# Patient Record
Sex: MALE | Race: WHITE | Employment: OTHER | URBAN - METROPOLITAN AREA
[De-identification: names, ages, dates, MRNs, and addresses within clinical notes are randomized per-mention and may not be internally consistent; named-entity substitution may affect disease eponyms.]

---

## 2021-02-09 ENCOUNTER — HOSPITAL ENCOUNTER (EMERGENCY)
Age: 76
Discharge: HOME | End: 2021-02-09
Attending: EMERGENCY MEDICINE
Payer: MEDICARE

## 2021-02-09 ENCOUNTER — APPOINTMENT (OUTPATIENT)
Dept: GENERAL RADIOLOGY | Age: 76
End: 2021-02-09
Attending: EMERGENCY MEDICINE
Payer: MEDICARE

## 2021-02-09 VITALS
WEIGHT: 229.28 LBS | BODY MASS INDEX: 33.96 KG/M2 | RESPIRATION RATE: 16 BRPM | HEIGHT: 69 IN | HEART RATE: 100 BPM | DIASTOLIC BLOOD PRESSURE: 83 MMHG | TEMPERATURE: 98.1 F | OXYGEN SATURATION: 98 % | SYSTOLIC BLOOD PRESSURE: 135 MMHG

## 2021-02-09 DIAGNOSIS — J06.9 UPPER RESPIRATORY TRACT INFECTION, UNSPECIFIED TYPE: Primary | ICD-10-CM

## 2021-02-09 LAB — SARS-COV-2, COV2: NORMAL

## 2021-02-09 PROCEDURE — 71045 X-RAY EXAM CHEST 1 VIEW: CPT

## 2021-02-09 PROCEDURE — 99284 EMERGENCY DEPT VISIT MOD MDM: CPT

## 2021-02-09 PROCEDURE — U0003 INFECTIOUS AGENT DETECTION BY NUCLEIC ACID (DNA OR RNA); SEVERE ACUTE RESPIRATORY SYNDROME CORONAVIRUS 2 (SARS-COV-2) (CORONAVIRUS DISEASE [COVID-19]), AMPLIFIED PROBE TECHNIQUE, MAKING USE OF HIGH THROUGHPUT TECHNOLOGIES AS DESCRIBED BY CMS-2020-01-R: HCPCS

## 2021-02-09 PROCEDURE — U0005 INFEC AGEN DETEC AMPLI PROBE: HCPCS

## 2021-02-09 RX ORDER — TRIAMCINOLONE ACETONIDE 55 UG/1
2 SPRAY, METERED NASAL DAILY
Qty: 1 BOTTLE | Refills: 0 | Status: ON HOLD | OUTPATIENT
Start: 2021-02-09 | End: 2022-03-31 | Stop reason: CLARIF

## 2021-02-09 RX ORDER — ALBUTEROL SULFATE 0.83 MG/ML
2.5 SOLUTION RESPIRATORY (INHALATION)
Qty: 30 NEBULE | Refills: 1 | Status: ON HOLD | OUTPATIENT
Start: 2021-02-09 | End: 2022-03-31 | Stop reason: CLARIF

## 2021-02-09 RX ORDER — BENZONATATE 100 MG/1
100 CAPSULE ORAL
Qty: 30 CAP | Refills: 0 | Status: SHIPPED | OUTPATIENT
Start: 2021-02-09 | End: 2021-02-16

## 2021-02-09 RX ORDER — AZITHROMYCIN 250 MG/1
TABLET, FILM COATED ORAL
Qty: 6 TAB | Refills: 0 | Status: SHIPPED | OUTPATIENT
Start: 2021-02-09 | End: 2021-02-14

## 2021-02-09 RX ORDER — METFORMIN HYDROCHLORIDE 500 MG/1
TABLET ORAL 2 TIMES DAILY WITH MEALS
COMMUNITY

## 2021-02-09 RX ORDER — NEBULIZER AND COMPRESSOR
1 EACH MISCELLANEOUS
Qty: 1 EACH | Refills: 0 | Status: ON HOLD | OUTPATIENT
Start: 2021-02-09 | End: 2022-03-31 | Stop reason: CLARIF

## 2021-02-09 RX ORDER — ATORVASTATIN CALCIUM 10 MG/1
TABLET, FILM COATED ORAL DAILY
Status: ON HOLD | COMMUNITY
End: 2022-03-31 | Stop reason: CLARIF

## 2021-02-09 RX ORDER — ALBUTEROL SULFATE 90 UG/1
2 AEROSOL, METERED RESPIRATORY (INHALATION)
Qty: 1 INHALER | Refills: 0 | Status: ON HOLD | OUTPATIENT
Start: 2021-02-09 | End: 2022-03-31 | Stop reason: CLARIF

## 2021-02-09 NOTE — DISCHARGE INSTRUCTIONS
Return to the ED for fevers, difficulty breathing, oxygen saturations below 90% or any other concern.

## 2021-02-09 NOTE — ED TRIAGE NOTES
Pt arrives with chronic bonchitis that has gotten worse over the last 10 days resulting in some SOB and airway tightness this morning.

## 2021-02-09 NOTE — ED PROVIDER NOTES
Patient is a 81-year-old male with past medical history significant for hyperlipidemia, hypertension, chronic bronchitis who has had symptoms of upper respiratory infection for approximately 10 days. He states he thought he can manage at home but started to feel more congested today which prompted him to come to the emergency department. Denies any fevers or chills. States he has had episodes of this over the years and notes that it all started when he was traveling around [place] while living there some years ago. He states he was told he developed some kind of bacterial bronchitis that was resistant to antibiotics and was seeing a pulmonologist while still living in [place]. He notes that they had seen some evidence of scarring in his lungs but denies any specific diagnosis. Patient states he has felt some postnasal drip but feels that he is coughing things up from his lungs. States it has been thick but is unsure what it looks like as he has been swallowing it. Patient reports that normally when he feels this way he requires a course of antibiotics. Denies seeing a pulmonologist in this area but notes he has an appointment with his wife's pulmonologist in the near future. Denies known exposures to Covid and states that he and his wife have been basically sheltering in place. He notes that she had actually had a Covid test not too long ago that was negative. Patient is a non-smoker. Past Medical History:   Diagnosis Date    Chronic bronchitis ([address]     High cholesterol     Hypertension        Past Surgical History:   Procedure Laterality Date    HX ORTHOPAEDIC      R TKR    HX ORTHOPAEDIC      L shoulder tendon replacement    HX ORTHOPAEDIC      L broken wrist repair, with hardware         History reviewed. No pertinent family history.     Social History     Socioeconomic History    Marital status:      Spouse name: Not on file    Number of children: Not on file    Years of education: Not on file    Highest education level: Not on file   Occupational History    Not on file   Social Needs    Financial resource strain: Not on file    Food insecurity     Worry: Not on file     Inability: Not on file    Transportation needs     Medical: Not on file     Non-medical: Not on file   Tobacco Use    Smoking status: Never Smoker    Smokeless tobacco: Never Used   Substance and Sexual Activity    Alcohol use: Never     Frequency: Never    Drug use: Not on file    Sexual activity: Not on file   Lifestyle    Physical activity     Days per week: Not on file     Minutes per session: Not on file    Stress: Not on file   Relationships    Social connections     Talks on phone: Not on file     Gets together: Not on file     Attends Christianity service: Not on file     Active member of club or organization: Not on file     Attends meetings of clubs or organizations: Not on file     Relationship status: Not on file    Intimate partner violence     Fear of current or ex partner: Not on file     Emotionally abused: Not on file     Physically abused: Not on file     Forced sexual activity: Not on file   Other Topics Concern    Not on file   Social History Narrative    Not on file         ALLERGIES: Patient has no known allergies. Review of Systems   Constitutional: Negative for chills, diaphoresis and fever. HENT: Positive for congestion and postnasal drip. Negative for drooling, facial swelling, nosebleeds, sinus pressure, sinus pain, trouble swallowing and voice change. Eyes: Negative for photophobia. Respiratory: Positive for cough. Negative for stridor. Cardiovascular: Negative for chest pain and leg swelling. Gastrointestinal: Negative for abdominal pain. Musculoskeletal: Negative for back pain and neck pain. Skin: Negative for rash. Neurological: Negative for speech difficulty, light-headedness and headaches. Hematological: Does not bruise/bleed easily. Psychiatric/Behavioral: Negative. Vitals:    02/09/21 0850   BP: (!) 158/95   Pulse: (!) 103   Resp: 16   Temp: 98.1 °F (36.7 °C)   SpO2: 95%   Weight: 104 kg (229 lb 4.5 oz)   Height: 5' 9\" (1.753 m)            Physical Exam  Vitals signs and nursing note reviewed. Constitutional:       General: He is not in acute distress. Appearance: He is not toxic-appearing or diaphoretic. HENT:      Head: Normocephalic and atraumatic. Mouth/Throat:      Comments: Mask in place  Neck:      Musculoskeletal: Neck supple. Cardiovascular:      Rate and Rhythm: Regular rhythm. Pulmonary:      Effort: Pulmonary effort is normal.      Breath sounds: No decreased breath sounds, wheezing, rhonchi or rales. Chest:      Chest wall: No deformity or crepitus. Musculoskeletal:      Right lower leg: No edema. Left lower leg: No edema. Skin:     General: Skin is warm and dry. Neurological:      Mental Status: He is alert and oriented to person, place, and time. Psychiatric:         Mood and Affect: Mood normal.         Behavior: Behavior normal.          MDM  Number of Diagnoses or Management Options  Upper respiratory tract infection, unspecified type: new and requires workup  Diagnosis management comments: pts sats stable with ambulation in room. Patient given strict warning signs and symptoms to return.        Amount and/or Complexity of Data Reviewed  Tests in the radiology section of CPT®: reviewed and ordered    Patient Progress  Patient progress: stable         Procedures

## 2021-02-10 ENCOUNTER — PATIENT OUTREACH (OUTPATIENT)
Dept: CASE MANAGEMENT | Age: 76
End: 2021-02-10

## 2021-02-10 LAB
SARS-COV-2, XPLCVT: NOT DETECTED
SOURCE, COVRS: NORMAL

## 2021-02-10 NOTE — PROGRESS NOTES
Patient contacted regarding Nisha Lakhwinder. Discussed COVID-19 related testing which was pending at this time. Test results were pending. Patient informed of results, if available? yes     Care Transition Nurse/ Ambulatory Care Manager contacted the patient by telephone to perform post discharge assessment. Call within 2 business days of discharge: Yes Verified name and  with patient as identifiers. Provided introduction to self, and explanation of the CTN/ACM role, and reason for call due to risk factors for infection and/or exposure to COVID-19. Symptoms reviewed with patient who verbalized the following symptoms: no new symptoms and no worsening symptoms      Due to no new or worsening symptoms encounter was not routed to provider for escalation. Discussed follow-up appointments. If no appointment was previously scheduled, appointment scheduling offered:  Pt and wife recently moved to [place] from [place] and are establishing with a PCP in [facility] follow up appointment(s): No future appointments. Non-[facility] follow up appointment(s): NA     Advance Care Planning:   Does patient have an Advance Directive:  not on file. Patient has following risk factors of: no known risk factors and chronic bronchitis and ED visit to [facility] on 2021. CTN/ACM reviewed discharge instructions, medical action plan and red flags such as increased shortness of breath, increasing fever and signs of decompensation with patient who verbalized understanding. Discussed exposure protocols and quarantine with CDC Guidelines What to do if you are sick with coronavirus disease .  Patient was given an opportunity for questions and concerns. The patient agrees to contact the [facility] exposure line [phone], [address] and PCP office for questions related to their healthcare. CTN/ACM provided contact information for future needs.     Reviewed and educated patient on any new and changed medications related to discharge diagnosis Patient/family/caregiver given information for [employer] and agrees to enroll yes  Patient's preferred e-mail:    Patient's preferred phone number: [phone]  Based on Loop alert triggers, patient will be contacted by nurse care manager for worsening symptoms. Pt will be further monitored by COVID Loop Team based on severity of symptoms and risk factors. Pt confirmed that he has medications from the ED and will receive nebulizer equipment today. Pt does have pulse ox and reading is 97 to 98% today. Pt verified that he has maintenance medications. Pt is establishing with a PCP and has a physical scheduled for March 2021. Pt is agreeable to the [facility] Loop program and to a follow up call in 7 days. Pt was given [address] [facility] and [facility] telephone numbers and pt has my contact information.

## 2021-02-17 ENCOUNTER — PATIENT OUTREACH (OUTPATIENT)
Dept: CASE MANAGEMENT | Age: 76
End: 2021-02-17

## 2021-02-17 NOTE — PROGRESS NOTES
Called pt to follow up on ED visit SPT on 2/9/2021. LVM stating my name, CTN calling on behalf of [facility], date and time of call and my return telephone number.

## 2021-02-24 ENCOUNTER — PATIENT OUTREACH (OUTPATIENT)
Dept: CASE MANAGEMENT | Age: 76
End: 2021-02-24

## 2021-02-24 NOTE — PROGRESS NOTES
Patient resolved from [address] Transitions episode on 2/24/2021  Discussed COVID-19 related testing which was available at this time. Test results were negative. Patient informed of results, if available? yes     Patient/family has been provided the following resources and education related to COVID-19:                         Signs, symptoms and red flags related to COVID-19            [facility] exposure and quarantine guidelines            [facility] exposure contact - [phone]            Contact for their local Department of Health                 Patient currently reports that the following symptoms have improved:  no new symptoms, no worsening symptoms and Pt reports improved symptoms and is scheduled to see a PCP at the end of March. 2021. Kareem Founds No further outreach scheduled with this CTN/ACM/LPN/HC/ MA. Episode of Care resolved. Patient has this CTN/ACM/LPN/HC/MA contact information if future needs arise.

## 2022-02-21 ENCOUNTER — PREPPED CHART (OUTPATIENT)
Dept: URBAN - METROPOLITAN AREA CLINIC 67 | Facility: CLINIC | Age: 77
End: 2022-02-21

## 2022-02-21 PROBLEM — H33.193 FLAT RETINOSCHISIS: Noted: 2022-02-21

## 2022-02-21 PROBLEM — H43.813 POSTERIOR VITREOUS DETACHMENT: Noted: 2022-02-21

## 2022-02-21 PROBLEM — H35.3231 NEOVASCULAR AMD WITH ACTIVE CNV: Noted: 2022-02-21

## 2022-03-23 ASSESSMENT — VISUAL ACUITY
OD_CC: 20/25-2
OS_CC: 20/30-2

## 2022-03-23 ASSESSMENT — TONOMETRY
OS_IOP_MMHG: 18
OD_IOP_MMHG: 15

## 2022-03-28 ENCOUNTER — FOLLOW UP (OUTPATIENT)
Dept: URBAN - METROPOLITAN AREA CLINIC 67 | Facility: CLINIC | Age: 77
End: 2022-03-28

## 2022-03-28 DIAGNOSIS — H35.3231: ICD-10-CM

## 2022-03-28 PROCEDURE — 92202 OPSCPY EXTND ON/MAC DRAW: CPT | Mod: 59

## 2022-03-28 PROCEDURE — 92014 COMPRE OPH EXAM EST PT 1/>: CPT | Mod: 25

## 2022-03-28 PROCEDURE — 67028 INJECTION EYE DRUG: CPT

## 2022-03-28 PROCEDURE — 92134 CPTRZ OPH DX IMG PST SGM RTA: CPT

## 2022-03-28 PROCEDURE — PFS EYLEA PFS

## 2022-03-28 ASSESSMENT — TONOMETRY
OD_IOP_MMHG: 17
OS_IOP_MMHG: 18

## 2022-03-28 ASSESSMENT — VISUAL ACUITY
OS_CC: 20/50
OD_CC: 20/40+3
OS_PH: 20/40-1
OD_PH: 20/30-2

## 2022-03-29 RX ORDER — ROSUVASTATIN CALCIUM 10 MG/1
10 TABLET, COATED ORAL
COMMUNITY

## 2022-03-29 NOTE — PERIOP NOTES
[address] [place] [address] Dr Martinez Preprocedure Instructions      1. On the day of your surgery, please report to registration located on the 2nd floor of the  [facility]. yes    2. You must have a responsible adult to drive you to the hospital, stay at the hospital during your procedure and drive you home. If they leave your procedure will not be started (no exceptions). yes    3. Do not have anything to eat or drink (including water, gum, mints, coffee, and juice) after midnight. This does not apply to the medications you were instructed to take by your physician. yes  If you are currently taking Plavix, Coumadin, Aspirin, or other blood-thinning agents, contact your physician for special instructions. yes,    4. If you are having a procedure that requires bowel prep: We recommend that you have only clear liquids the day before your procedure and begin your bowel prep by 5:00 pm.  You may continue to drink clear liquids until midnight. If for any reason you are not able to complete your prep please notify your physician immediately. yes    5. Have a list of all current medications, including vitamins, herbal supplements and any other over the counter medications. yes    6. If you wear glasses, contacts, dentures and/or hearing aids, they may be removed prior to procedure, please bring a case to store them in. no    7. You should understand that if you do not follow these instructions your procedure may be cancelled. If your physical condition changes (I.e. fever, cold or flu) please contact your doctor as soon as possible. 8. It is important that you be on time.   If for any reason you are unable to keep your appointment please call [phone] the day of or your physicians office prior to your scheduled procedure

## 2022-03-31 ENCOUNTER — ANESTHESIA EVENT (OUTPATIENT)
Dept: ENDOSCOPY | Age: 77
End: 2022-03-31
Payer: MEDICARE

## 2022-03-31 ENCOUNTER — ANESTHESIA (OUTPATIENT)
Dept: ENDOSCOPY | Age: 77
End: 2022-03-31
Payer: MEDICARE

## 2022-03-31 ENCOUNTER — HOSPITAL ENCOUNTER (OUTPATIENT)
Age: 77
Setting detail: OUTPATIENT SURGERY
Discharge: HOME | End: 2022-03-31
Attending: INTERNAL MEDICINE | Admitting: INTERNAL MEDICINE
Payer: MEDICARE

## 2022-03-31 VITALS
DIASTOLIC BLOOD PRESSURE: 68 MMHG | TEMPERATURE: 96.9 F | WEIGHT: 206.57 LBS | HEART RATE: 68 BPM | BODY MASS INDEX: 30.6 KG/M2 | RESPIRATION RATE: 22 BRPM | SYSTOLIC BLOOD PRESSURE: 110 MMHG | OXYGEN SATURATION: 96 % | HEIGHT: 69 IN

## 2022-03-31 LAB
GLUCOSE BLD STRIP.AUTO-MCNC: 111 MG/DL (ref 65–117)
SERVICE CMNT-IMP: NORMAL

## 2022-03-31 PROCEDURE — 2709999900 HC NON-CHARGEABLE SUPPLY: Performed by: INTERNAL MEDICINE

## 2022-03-31 PROCEDURE — 77030021593 HC FCPS BIOP ENDOSC BSC -A: Performed by: INTERNAL MEDICINE

## 2022-03-31 PROCEDURE — 74011250636 HC RX REV CODE- 250/636: Performed by: NURSE ANESTHETIST, CERTIFIED REGISTERED

## 2022-03-31 PROCEDURE — 74011000250 HC RX REV CODE- 250: Performed by: NURSE ANESTHETIST, CERTIFIED REGISTERED

## 2022-03-31 PROCEDURE — 82962 GLUCOSE BLOOD TEST: CPT

## 2022-03-31 PROCEDURE — 88305 TISSUE EXAM BY PATHOLOGIST: CPT

## 2022-03-31 PROCEDURE — 76060000032 HC ANESTHESIA 0.5 TO 1 HR: Performed by: INTERNAL MEDICINE

## 2022-03-31 PROCEDURE — 77030013992 HC SNR POLYP ENDOSC BSC -B: Performed by: INTERNAL MEDICINE

## 2022-03-31 PROCEDURE — 74011250636 HC RX REV CODE- 250/636: Performed by: INTERNAL MEDICINE

## 2022-03-31 PROCEDURE — 76040000007: Performed by: INTERNAL MEDICINE

## 2022-03-31 RX ORDER — ATROPINE SULFATE 0.1 MG/ML
0.4 INJECTION INTRAVENOUS
Status: DISCONTINUED | OUTPATIENT
Start: 2022-03-31 | End: 2022-03-31 | Stop reason: HOSPADM

## 2022-03-31 RX ORDER — NALOXONE HYDROCHLORIDE 0.4 MG/ML
0.4 INJECTION, SOLUTION INTRAMUSCULAR; INTRAVENOUS; SUBCUTANEOUS
Status: DISCONTINUED | OUTPATIENT
Start: 2022-03-31 | End: 2022-03-31 | Stop reason: HOSPADM

## 2022-03-31 RX ORDER — PANTOPRAZOLE SODIUM 40 MG/1
40 TABLET, DELAYED RELEASE ORAL DAILY
Qty: 30 TABLET | Refills: 3 | Status: SHIPPED | OUTPATIENT
Start: 2022-03-31

## 2022-03-31 RX ORDER — DEXTROMETHORPHAN/PSEUDOEPHED 2.5-7.5/.8
1.2 DROPS ORAL
Status: DISCONTINUED | OUTPATIENT
Start: 2022-03-31 | End: 2022-03-31 | Stop reason: HOSPADM

## 2022-03-31 RX ORDER — LIDOCAINE HYDROCHLORIDE 20 MG/ML
INJECTION, SOLUTION EPIDURAL; INFILTRATION; INTRACAUDAL; PERINEURAL AS NEEDED
Status: DISCONTINUED | OUTPATIENT
Start: 2022-03-31 | End: 2022-03-31 | Stop reason: HOSPADM

## 2022-03-31 RX ORDER — SODIUM CHLORIDE 9 MG/ML
50 INJECTION, SOLUTION INTRAVENOUS CONTINUOUS
Status: DISCONTINUED | OUTPATIENT
Start: 2022-03-31 | End: 2022-03-31 | Stop reason: HOSPADM

## 2022-03-31 RX ORDER — VIT A/VIT C/VIT E/ZINC/COPPER 4296-226
1 CAPSULE ORAL DAILY
COMMUNITY

## 2022-03-31 RX ORDER — BISMUTH SUBSALICYLATE 262 MG
1 TABLET,CHEWABLE ORAL DAILY
COMMUNITY

## 2022-03-31 RX ORDER — MIDAZOLAM HYDROCHLORIDE 1 MG/ML
.25-5 INJECTION, SOLUTION INTRAMUSCULAR; INTRAVENOUS
Status: DISCONTINUED | OUTPATIENT
Start: 2022-03-31 | End: 2022-03-31 | Stop reason: HOSPADM

## 2022-03-31 RX ORDER — PANTOPRAZOLE SODIUM 40 MG/1
40 TABLET, DELAYED RELEASE ORAL DAILY
Qty: 30 TABLET | Refills: 3 | OUTPATIENT
Start: 2022-03-31

## 2022-03-31 RX ORDER — PROPOFOL 10 MG/ML
INJECTION, EMULSION INTRAVENOUS
Status: DISCONTINUED | OUTPATIENT
Start: 2022-03-31 | End: 2022-03-31 | Stop reason: HOSPADM

## 2022-03-31 RX ORDER — FLUMAZENIL 0.1 MG/ML
0.2 INJECTION INTRAVENOUS
Status: DISCONTINUED | OUTPATIENT
Start: 2022-03-31 | End: 2022-03-31 | Stop reason: HOSPADM

## 2022-03-31 RX ORDER — EPINEPHRINE 0.1 MG/ML
1 INJECTION INTRACARDIAC; INTRAVENOUS
Status: DISCONTINUED | OUTPATIENT
Start: 2022-03-31 | End: 2022-03-31 | Stop reason: HOSPADM

## 2022-03-31 RX ORDER — PROPOFOL 10 MG/ML
INJECTION, EMULSION INTRAVENOUS AS NEEDED
Status: DISCONTINUED | OUTPATIENT
Start: 2022-03-31 | End: 2022-03-31 | Stop reason: HOSPADM

## 2022-03-31 RX ADMIN — LIDOCAINE HYDROCHLORIDE 50 MG: 20 INJECTION, SOLUTION INTRAVENOUS at 12:48

## 2022-03-31 RX ADMIN — PROPOFOL 150 MG: 10 INJECTION, EMULSION INTRAVENOUS at 12:50

## 2022-03-31 RX ADMIN — PROPOFOL INJECTABLE EMULSION 140 MCG/KG/MIN: 10 INJECTION, EMULSION INTRAVENOUS at 12:48

## 2022-03-31 RX ADMIN — SODIUM CHLORIDE 50 ML/HR: 9 INJECTION, SOLUTION INTRAVENOUS at 12:26

## 2022-03-31 NOTE — PROGRESS NOTES
Carol Rolon  1945  [id number]    Situation:  Verbal report received from: [facility] RN   Procedure: Procedure(s):  ESOPHAGOGASTRODUODENOSCOPY (EGD)  COLONOSCOPY  ESOPHAGOGASTRODUODENAL (EGD) BIOPSY  ENDOSCOPIC POLYPECTOMY    Background:    Preoperative diagnosis: DYSPHAGIA, COLON CANCER SCREENING  Postoperative diagnosis: Diverticulosis, colon polyps removed. :  Dr. Natalio Carlson  Assistant(s): Endoscopy Technician-1: Alexa Thomson  Endoscopy RN-1: Nathan Negrete RN    Specimens:   ID Type Source Tests Collected by Time Destination   1 : ge junction biopsy Preservative   Bita Batres MD 3/31/2022 1251 Pathology   2 : transverse polyps Preservative Colon, Transverse  Bita Batres MD 3/31/2022 1302 Pathology     H. Pylori  no    Assessment:    Anesthesia gave intra-procedure sedation and medications, see anesthesia flow sheet no    Intravenous fluids: NS@ KVO     Vital signs stable     Abdominal assessment: round and soft     Recommendation:  Discharge patient per MD order.   Return to floor  Family or Friend   Permission to share finding with family or friend yes

## 2022-03-31 NOTE — PERIOP NOTES
1248  Timeout performed. Anesthesia staff at patient's bedside administering anesthesia and monitoring patients vital signs throughout procedure. See anesthesia note. Post procedure, report received from Mich RODRIGUEZ. 1  Endoscope was pre-cleaned at bedside immediately following procedure by [email or web address] Fátima carmen. 1319  Patient tolerated procedure. Abdomen soft and patient arousable and voices no complaints. Patient transported to endoscopy recovery area. Report given to post procedure RNBertha.

## 2022-03-31 NOTE — PROGRESS NOTES
Endoscopy discharge instructions have been reviewed and given to patient. The patient verbalized understanding and acceptance of instructions. Dr. Lizett Painter discussed with patient procedure findings and next steps.

## 2022-03-31 NOTE — H&P
The patient is a 77year old male who presents with a complaint of Choking. The patients presents for due to new symptoms (Pt presents for complaints of intermittent dysphagia.  He reports history of duodenal ulcer appx 30 years ago. ). The onset of the choking has been gradual and has been occurring in an intermittent pattern. The course has been recurrent. The choking is described as being located in the neck and throat. The symptoms solids only (notably rice). There has been no associated heartburn. Problem List/Past Medical (Nanda Oliveira; 3/17/2022 9:12 AM)  Hypertension      Medication History (Nanda Oliveira; 3/17/2022 9:19 AM)  amLODIPine Besylate  (10MG Tablet, Oral) Active. metFORMIN HCl  (500MG Tablet, Oral) Active. Metoprolol & Diet Manage Prod  (50MG Misc, Oral) Active. Olmesartan Medoxomil-HCTZ  (40-12.5MG Tablet, Oral) Active. Rosuvastatin Calcium  (40MG Cap Sprinkle, Oral) Active. Multi For Him 50+  (Oral) Active. Co Q10  (100MG Capsule, Oral) Active. PreserVision AREDS 2  (Oral) Active. Medications Reconciled     Family History (Nanda Johnson Felice; 3/17/2022 9:21 AM)  Angina, Class I   Mother. Congestive Heart Failure   Mother. Cardiac Arrest   Mother, Father. Review of Systems (Nanda VALERASteve Johnson Felice; 3/17/2022 9:10 AM)  General Not Present- Chronic Fatigue, Poor Appetite, Weight Gain and Weight Loss. Skin Not Present- Itching, Rash and Skin Color Changes. HEENT Not Present- Hearing Loss and Vertigo. Respiratory Not Present- Difficulty Breathing and TB exposure. Cardiovascular Not Present- Chest Pain, Use of Antibiotics before Dental Procedures and Use of Blood Thinners. Gastrointestinal Present- See HPI. Musculoskeletal Not Present- Arthritis, Hip Replacement Surgery and Knee Replacement Surgery. Neurological Not Present- Weakness. Psychiatric Not Present- Depression. Endocrine Not Present- Diabetes and Thyroid Problems. Hematology Not Present- Anemia.     Vitals (Nanda Mancuso Pollen; 3/17/2022 9:09 AM)  3/17/2022 9:08 AM  Weight: 210 lb   Height: 69 in   Body Surface Area: 2.11 m²   Body Mass Index: 31.01 kg/m²    Pulse: 89 (Regular)     BP: 139/83(Sitting, Left Arm, Standard)              Physical Exam (Isaias Jones; 3/17/2022 10:08 AM)  General  Mental Status - Alert. General Appearance - Cooperative, Pleasant, Not in acute distress. Orientation - Oriented X3. Integumentary  General Characteristics  Color - normal coloration of skin. Head and Neck  Neck  Global Assessment - supple. Eye  Sclera/Conjunctiva - Bilateral - No Jaundice - Bilateral.    Chest and Lung Exam  Chest and lung exam reveals  - quiet, even and easy respiratory effort with no use of accessory muscles. Auscultation  Breath sounds - Normal. Adventitious sounds - No Adventitious sounds. Cardiovascular  Auscultation  Rhythm - Regular, No Tachycardia, No Bradycardia . Heart Sounds - Normal heart sounds , S1 WNL and S2 WNL, No S3, No Summation Gallop. Murmurs & Other Heart Sounds - Auscultation of the heart reveals - No Murmurs. Abdomen  Palpation/Percussion  Tenderness - Non-Tender. Rebound tenderness - No rebound. Rigidity (guarding) - No Rigidity. Dullness to percussion - No abnormal dullness to percussion. Liver - No hepatosplenomegaly. Abdominal Mass Palpable - No masses. Other Characteristics - No Ascites. Auscultation  Auscultation of the abdomen reveals - Bowel sounds normal, No Abdominal bruits and No Succussion splash. Rectal - Did not examine. Peripheral Vascular  Lower Extremity  Palpation - Edema - Left - No edema - Left. Edema - Right - No edema - Right. Neurologic  Motor  Involuntary Movements - Asterixis - not present. Assessment & Plan (Isaias MANJITSteve Avery AGACNP; 3/17/2022 10:59 AM)  Dysphagia (R13.10)  Impression: Concerning for stricture, schatzkis ring, hiatal hernia. Diet modifications to manage symptoms were explained in detail.  Discussed plan to proceed with EGD with dilation as next step. Current Plans  EGD W/BIOPSY (40676)  Screening for colon cancer (Z12.11)  Impression: Due for screening colonoscopy. Last was 2017. He feels confident he will be able to swallow Sutab prep. Current Plans  Colonoscopy (36692) (Discussed risks and benefits with the patient to include:; perforation, post polypectomy, or post biopsy bleeding, missed lesions, and sedation reactions.)  Started Sutab [id number] MG Oral Tablet, 1 (one) Tablet Take as directed before Colonoscopy, #24, 03/17/2022, No Refill, Dispense as written. Local Order:  Pharmacist Notes: Coupon Code: [id number] PCN:[id number] TCFSV:[id number] ID: [id number]  Pt Education - How to access health information online: discussed with patient and provided information.   Signed by CHERYL Perera (3/17/2022 11:00 AM)

## 2022-03-31 NOTE — ANESTHESIA POSTPROCEDURE EVALUATION
Procedure(s):  ESOPHAGOGASTRODUODENOSCOPY (EGD)  COLONOSCOPY  ESOPHAGOGASTRODUODENAL (EGD) BIOPSY  ENDOSCOPIC POLYPECTOMY. MAC    Anesthesia Post Evaluation        Patient location during evaluation: bedside  Level of consciousness: awake  Pain management: satisfactory to patient  Airway patency: patent  Anesthetic complications: no  Cardiovascular status: acceptable  Respiratory status: acceptable  Hydration status: acceptable        INITIAL Post-op Vital signs:   Vitals Value Taken Time   /68 03/31/22 1342   Temp     Pulse 67 03/31/22 1346   Resp 16 03/31/22 1346   SpO2 97 % 03/31/22 1346   Vitals shown include unvalidated device data.

## 2022-03-31 NOTE — ANESTHESIA PREPROCEDURE EVALUATION
Relevant Problems   No relevant active problems       Anesthetic History               Review of Systems / Medical History      Pulmonary                   Neuro/Psych              Cardiovascular    Hypertension                   GI/Hepatic/Renal                Endo/Other    Diabetes         Other Findings              Physical Exam    Airway  Mallampati: II  TM Distance: 4 - 6 cm  Neck ROM: normal range of motion   Mouth opening: Normal     Cardiovascular    Rhythm: regular  Rate: normal         Dental  No notable dental hx       Pulmonary  Breath sounds clear to auscultation               Abdominal         Other Findings            Anesthetic Plan    ASA: 2  Anesthesia type: MAC            Anesthetic plan and risks discussed with: Patient

## 2022-03-31 NOTE — PROCEDURES
Anais Cochran M.D.  [phone]            3/31/2022          Colonoscopy Operative Report  Dolan Meckel  :  1945  Jes Medical Record Number:  [id number]      Indications:    Personal history of colon polyps (screening only)     :  Nicholas Bartholomew MD    Referring Provider: Ailin Vicente MD    Sedation:  MAC anesthesia    Pre-Procedural Exam:      Airway: clear,  No airway problems anticipated  Heart: RRR, without gallops or rubs  Lungs: clear bilaterally without wheezes, crackles, or rhonchi  Abdomen: soft, nontender, nondistended, bowel sounds present  Mental Status: awake, alert and oriented to person, place and time     Procedure Details:  After informed consent was obtained with all risks and benefits of procedure explained and preoperative exam completed, the patient was taken to the endoscopy suite and placed in the left lateral decubitus position. Upon sequential sedation as per above, a digital rectal exam was performed. The Olympus videocolonoscope  was inserted in the rectum and carefully advanced to the cecum, which was identified by the ileocecal valve and appendiceal orifice. The quality of preparation was good. The colonoscope was slowly withdrawn with careful inspection and evaluation between folds. Retroflexion in the rectum was performed. Findings:   Terminal Ileum: not intubated  Cecum: normal  Ascending Colon: normal  Transverse Colon: 3  Sessile polyp(s), the largest 3 mm in size; Descending Colon: normal  Sigmoid: no mucosal lesion appreciated  mild diverticulosis; Rectum: no mucosal lesion appreciated  Grade 1 internal hemorrhoid(s); Interventions:  3 complete polypectomy were performed using cold snare  and the polyps were  retrieved    Specimen Removed:  specimen #1, 2, 3 mm in size, located in the transverse colon removed by cold snare and retrieved for pathology    Complications: None. EBL:  None. Impression:   Three diminutive polyps removed and sent to pathology, otherwise mucosa within normal.                          Mild sigmoid diverticulosis and small internal hemorrhoids    Recommendations:  -If adenoma is present, repeat colonoscopy in 3 years.   -High fiber diet.    -Resume normal medication(s). Discharge Disposition:  Home in the company of a  when able to ambulate.     Robby Amor MD  3/31/2022  1:25 PM

## 2022-03-31 NOTE — DISCHARGE INSTRUCTIONS
[address] [employer]. Vanessa Rankin M.D.  [phone]                 COLON and EGD DISCHARGE INSTRUCTIONS    3/31/2022    Rodrigue Abernathy  :  1945  Jes Medical Record Number:  [id number]      DISCOMFORT:  Sore throat- throat lozenges or warm salt water gargle  Redness at IV site- apply warm compress to area; if redness or soreness persist- contact your physician  There may be a slight amount of blood passed from the rectum  Gaseous discomfort- walking, belching will help relieve any discomfort  You may not operate a vehicle for 12 hours  You may not engage in an occupation involving machinery or appliances for rest of today  You may not drink alcoholic beverages for at least 12 hours  Avoid making any critical decisions for at least 24 hour  DIET:   High fiber diet. GERD diet: avoid fried and fatty foods. peppermint, chocolate, alcohol, coffee, citrus fruits and juices, tomoato products; avoid lying down for 2 to 3 hours after eating.   - however -  remember your colon is empty and a heavy meal will produce gas. Avoid these foods:  vegetables, fried / greasy foods, carbonated drinks for today     ACTIVITY:  You may  resume your normal daily activities it is recommended that you spend the remainder of the day resting -  avoid any strenuous activity and driving. CALL M.D. ANY SIGN OF:   Increasing pain, nausea, vomiting  Abdominal distension (swelling)  New increased bleeding (oral or rectal)  Fever (chills)  Pain in chest area  Bloody discharge from nose or mouth  Shortness of breath      Follow-up Instructions:   Call Dr. Светлана Black if any questions at [phone]. Results of procedure / biopsy in 7 to 10 days, we will call you with these results. Your endoscopy showed mild esophagitis at the bottom of the esophagus, related to acid reflux, no stricture seen, biopsies were obtained. Start taking pantoprazole 40 mg daily and follow anti-reflux measures.    Your colonoscopy showed three diminutive polyps that were removed and sent to pathology, mild diverticulosis and small internal hemorrhoids.

## 2022-03-31 NOTE — PROCEDURES
Yamini Langston M.D.  [phone]           3/31/2022                EGD Operative Report  Apurva Silva  :  1945  Jakob Interiano Medical Record Number:  [id number]      Indication:  Dysphagia/odynophagia, GERD     : Prudencio Schwab, MD    Referring Provider:  Rich Avery MD      Anesthesia/Sedation:  MAC anesthesia    Airway assessment: No airway problems anticipated    Pre-Procedural Exam:      Airway: clear, no airway problems anticipated  Heart: RRR, without gallops or rubs  Lungs: clear bilaterally without wheezes, crackles, or rhonchi  Abdomen: soft, nontender, nondistended, bowel sounds present  Mental Status: awake, alert and oriented to person, place and time       Procedure Details     After infomed consent was obtained for the procedure, with all risks and benefits of procedure explained the patient was taken to the endoscopy suite and placed in the left lateral decubitus position. Following sequential administration of sedation as per above, the endoscope was inserted into the mouth and advanced under direct vision to second portion of the duodenum. A careful inspection was made as the gastroscope was withdrawn, including a retroflexed view of the proximal stomach; findings and interventions are described below. Findings:   Esophagus:Evidence of erythema and whitish exudates seen in the distal esophagus at the GE-junction consistent with mild esophagitis, no lesions or strictures seen. Remaining mucosa within normal  Stomach: normal   Duodenum/jejunum: normal    Therapies:  none    Specimens: GE-junction           Complications:   None; patient tolerated the procedure well. EBL:  None.            Impression:    Mild esophagitis, otherwise mucosa within normal.     Recommendations:    -Acid suppression with a proton pump inhibitor.  -Await pathology.  -Anti-reflux measures  -Soft diet    Otf Earl MD

## 2022-05-03 ENCOUNTER — FOLLOW UP (OUTPATIENT)
Dept: URBAN - METROPOLITAN AREA CLINIC 67 | Facility: CLINIC | Age: 77
End: 2022-05-03

## 2022-05-03 DIAGNOSIS — H35.3231: ICD-10-CM

## 2022-05-03 PROCEDURE — PFS EYLEA PFS

## 2022-05-03 PROCEDURE — 92014 COMPRE OPH EXAM EST PT 1/>: CPT | Mod: 25

## 2022-05-03 PROCEDURE — 92134 CPTRZ OPH DX IMG PST SGM RTA: CPT

## 2022-05-03 PROCEDURE — 92202 OPSCPY EXTND ON/MAC DRAW: CPT

## 2022-05-03 PROCEDURE — 67028 INJECTION EYE DRUG: CPT | Mod: 59

## 2022-05-03 ASSESSMENT — TONOMETRY
OD_IOP_MMHG: 19
OS_IOP_MMHG: 20

## 2022-05-03 ASSESSMENT — VISUAL ACUITY
OS_CC: 20/100
OD_PH: 20/30
OS_PH: 20/40
OD_CC: 20/40

## 2022-06-06 ENCOUNTER — FOLLOW UP (OUTPATIENT)
Dept: URBAN - METROPOLITAN AREA CLINIC 67 | Facility: CLINIC | Age: 77
End: 2022-06-06

## 2022-06-06 DIAGNOSIS — H35.3231: ICD-10-CM

## 2022-06-06 PROCEDURE — 92014 COMPRE OPH EXAM EST PT 1/>: CPT | Mod: 25

## 2022-06-06 PROCEDURE — 92202 OPSCPY EXTND ON/MAC DRAW: CPT | Mod: 59,RT

## 2022-06-06 PROCEDURE — PFS EYLEA PFS

## 2022-06-06 PROCEDURE — 67028 INJECTION EYE DRUG: CPT

## 2022-06-06 PROCEDURE — 92134 CPTRZ OPH DX IMG PST SGM RTA: CPT

## 2022-06-06 ASSESSMENT — VISUAL ACUITY
OS_CC: 20/40
OS_PH: 20/30
OD_CC: 20/30+1

## 2022-06-06 ASSESSMENT — TONOMETRY
OD_IOP_MMHG: 15
OS_IOP_MMHG: 17

## 2022-07-12 ENCOUNTER — FOLLOW UP (OUTPATIENT)
Dept: URBAN - METROPOLITAN AREA CLINIC 67 | Facility: CLINIC | Age: 77
End: 2022-07-12

## 2022-07-12 DIAGNOSIS — H35.3231: ICD-10-CM

## 2022-07-12 PROCEDURE — 67028 INJECTION EYE DRUG: CPT | Mod: 50

## 2022-07-12 PROCEDURE — 92134 CPTRZ OPH DX IMG PST SGM RTA: CPT

## 2022-07-12 PROCEDURE — 92014 COMPRE OPH EXAM EST PT 1/>: CPT | Mod: 25

## 2022-07-12 PROCEDURE — 92202 OPSCPY EXTND ON/MAC DRAW: CPT | Mod: NC

## 2022-07-12 PROCEDURE — PFS EYLEA PFS

## 2022-07-12 ASSESSMENT — TONOMETRY
OD_IOP_MMHG: 16
OS_IOP_MMHG: 18

## 2022-07-12 ASSESSMENT — VISUAL ACUITY
OS_CC: 20/30
OS_PH: 20/25-1
OD_CC: 20/25

## 2022-08-15 ENCOUNTER — FOLLOW UP (OUTPATIENT)
Dept: URBAN - METROPOLITAN AREA CLINIC 67 | Facility: CLINIC | Age: 77
End: 2022-08-15

## 2022-08-15 DIAGNOSIS — H35.3231: ICD-10-CM

## 2022-08-15 PROCEDURE — PFS EYLEA PFS

## 2022-08-15 PROCEDURE — 67028 INJECTION EYE DRUG: CPT | Mod: 50

## 2022-08-15 PROCEDURE — 92014 COMPRE OPH EXAM EST PT 1/>: CPT | Mod: 25

## 2022-08-15 PROCEDURE — 92202 OPSCPY EXTND ON/MAC DRAW: CPT | Mod: NC

## 2022-08-15 PROCEDURE — 92134 CPTRZ OPH DX IMG PST SGM RTA: CPT

## 2022-08-15 ASSESSMENT — TONOMETRY
OD_IOP_MMHG: 18
OS_IOP_MMHG: 18

## 2022-08-15 ASSESSMENT — VISUAL ACUITY
OS_CC: 20/40-1
OD_CC: 20/40

## 2022-09-19 ENCOUNTER — FOLLOW UP (OUTPATIENT)
Dept: URBAN - METROPOLITAN AREA CLINIC 67 | Facility: CLINIC | Age: 77
End: 2022-09-19

## 2022-09-19 DIAGNOSIS — H35.3231: ICD-10-CM

## 2022-09-19 PROCEDURE — PFS EYLEA PFS

## 2022-09-19 PROCEDURE — 92014 COMPRE OPH EXAM EST PT 1/>: CPT | Mod: 25

## 2022-09-19 PROCEDURE — 92134 CPTRZ OPH DX IMG PST SGM RTA: CPT

## 2022-09-19 PROCEDURE — 67028 INJECTION EYE DRUG: CPT | Mod: 50

## 2022-09-19 PROCEDURE — 92202 OPSCPY EXTND ON/MAC DRAW: CPT | Mod: NC

## 2022-09-19 ASSESSMENT — VISUAL ACUITY
OS_CC: 20/25-1
OD_CC: 20/25-2

## 2022-09-19 ASSESSMENT — TONOMETRY
OD_IOP_MMHG: 17
OS_IOP_MMHG: 16

## 2022-10-24 ENCOUNTER — FOLLOW UP (OUTPATIENT)
Dept: URBAN - METROPOLITAN AREA CLINIC 67 | Facility: CLINIC | Age: 77
End: 2022-10-24

## 2022-10-24 DIAGNOSIS — H35.3231: ICD-10-CM

## 2022-10-24 PROCEDURE — 92202 OPSCPY EXTND ON/MAC DRAW: CPT | Mod: 59

## 2022-10-24 PROCEDURE — 92134 CPTRZ OPH DX IMG PST SGM RTA: CPT

## 2022-10-24 PROCEDURE — 92014 COMPRE OPH EXAM EST PT 1/>: CPT | Mod: 25

## 2022-10-24 PROCEDURE — 67028 INJECTION EYE DRUG: CPT

## 2022-10-24 ASSESSMENT — VISUAL ACUITY
OD_CC: 20/30
OS_CC: 20/30-1

## 2022-10-24 ASSESSMENT — TONOMETRY
OD_IOP_MMHG: 14
OS_IOP_MMHG: 16

## 2022-11-22 ENCOUNTER — FOLLOW UP (OUTPATIENT)
Dept: URBAN - METROPOLITAN AREA CLINIC 67 | Facility: CLINIC | Age: 77
End: 2022-11-22

## 2022-11-22 DIAGNOSIS — H33.193: ICD-10-CM

## 2022-11-22 DIAGNOSIS — H43.813: ICD-10-CM

## 2022-11-22 DIAGNOSIS — H35.3231: ICD-10-CM

## 2022-11-22 PROCEDURE — PFS EYLEA PFS

## 2022-11-22 PROCEDURE — 67028 INJECTION EYE DRUG: CPT | Mod: 50

## 2022-11-22 PROCEDURE — 92014 COMPRE OPH EXAM EST PT 1/>: CPT | Mod: 25

## 2022-11-22 PROCEDURE — 92134 CPTRZ OPH DX IMG PST SGM RTA: CPT

## 2022-11-22 ASSESSMENT — TONOMETRY
OD_IOP_MMHG: 11
OS_IOP_MMHG: 14

## 2022-11-22 ASSESSMENT — VISUAL ACUITY
OS_CC: 20/25-1
OD_CC: 20/30-2

## 2022-12-20 ENCOUNTER — FOLLOW UP (OUTPATIENT)
Dept: URBAN - METROPOLITAN AREA CLINIC 67 | Facility: CLINIC | Age: 77
End: 2022-12-20

## 2022-12-20 DIAGNOSIS — H35.3231: ICD-10-CM

## 2022-12-20 PROCEDURE — 92202 OPSCPY EXTND ON/MAC DRAW: CPT | Mod: NC

## 2022-12-20 PROCEDURE — PFS EYLEA PFS

## 2022-12-20 PROCEDURE — 92134 CPTRZ OPH DX IMG PST SGM RTA: CPT

## 2022-12-20 PROCEDURE — 67028 INJECTION EYE DRUG: CPT | Mod: 50

## 2022-12-20 PROCEDURE — 92014 COMPRE OPH EXAM EST PT 1/>: CPT | Mod: 25

## 2022-12-20 ASSESSMENT — VISUAL ACUITY
OS_CC: 20/30
OD_CC: 20/30+1

## 2022-12-20 ASSESSMENT — TONOMETRY
OS_IOP_MMHG: 17
OD_IOP_MMHG: 16

## 2023-01-24 ENCOUNTER — FOLLOW UP (OUTPATIENT)
Dept: URBAN - METROPOLITAN AREA CLINIC 67 | Facility: CLINIC | Age: 78
End: 2023-01-24

## 2023-01-24 DIAGNOSIS — H35.3231: ICD-10-CM

## 2023-01-24 PROCEDURE — 92014 COMPRE OPH EXAM EST PT 1/>: CPT | Mod: 25

## 2023-01-24 PROCEDURE — 92134 CPTRZ OPH DX IMG PST SGM RTA: CPT

## 2023-01-24 PROCEDURE — 67028 INJECTION EYE DRUG: CPT | Mod: 50

## 2023-01-24 PROCEDURE — PFS EYLEA PFS

## 2023-01-24 ASSESSMENT — VISUAL ACUITY
OS_PH: 20/30-1
OD_SC: 20/40-2
OD_CC: 20/40+1
OS_CC: 20/40-2

## 2023-01-24 ASSESSMENT — TONOMETRY
OS_IOP_MMHG: 18
OD_IOP_MMHG: 18

## 2023-02-28 ENCOUNTER — FOLLOW UP (OUTPATIENT)
Dept: URBAN - METROPOLITAN AREA CLINIC 67 | Facility: CLINIC | Age: 78
End: 2023-02-28

## 2023-02-28 DIAGNOSIS — H35.3231: ICD-10-CM

## 2023-02-28 PROCEDURE — 67028 INJECTION EYE DRUG: CPT

## 2023-02-28 PROCEDURE — 92014 COMPRE OPH EXAM EST PT 1/>: CPT | Mod: 25

## 2023-02-28 PROCEDURE — 92202 OPSCPY EXTND ON/MAC DRAW: CPT | Mod: 59

## 2023-02-28 PROCEDURE — 92134 CPTRZ OPH DX IMG PST SGM RTA: CPT

## 2023-02-28 ASSESSMENT — VISUAL ACUITY
OD_CC: 20/30-2
OS_CC: 20/25

## 2023-02-28 ASSESSMENT — TONOMETRY
OS_IOP_MMHG: 17
OD_IOP_MMHG: 18

## 2023-04-04 ENCOUNTER — FOLLOW UP (OUTPATIENT)
Dept: URBAN - METROPOLITAN AREA CLINIC 67 | Facility: CLINIC | Age: 78
End: 2023-04-04

## 2023-04-04 DIAGNOSIS — H35.3231: ICD-10-CM

## 2023-04-04 PROCEDURE — 92014 COMPRE OPH EXAM EST PT 1/>: CPT | Mod: 25

## 2023-04-04 PROCEDURE — 92202 OPSCPY EXTND ON/MAC DRAW: CPT | Mod: 59

## 2023-04-04 PROCEDURE — 67028 INJECTION EYE DRUG: CPT

## 2023-04-04 ASSESSMENT — TONOMETRY
OS_IOP_MMHG: 18
OD_IOP_MMHG: 18

## 2023-04-04 ASSESSMENT — VISUAL ACUITY
OD_CC: 20/30
OS_CC: 20/30-1
OS_PH: 20/25

## 2023-05-09 ENCOUNTER — FOLLOW UP (OUTPATIENT)
Dept: URBAN - METROPOLITAN AREA CLINIC 67 | Facility: CLINIC | Age: 78
End: 2023-05-09

## 2023-05-09 DIAGNOSIS — H26.492: ICD-10-CM

## 2023-05-09 DIAGNOSIS — H33.193: ICD-10-CM

## 2023-05-09 DIAGNOSIS — H35.3231: ICD-10-CM

## 2023-05-09 DIAGNOSIS — H43.813: ICD-10-CM

## 2023-05-09 PROCEDURE — 92134 CPTRZ OPH DX IMG PST SGM RTA: CPT

## 2023-05-09 PROCEDURE — 67028 INJECTION EYE DRUG: CPT | Mod: 50

## 2023-05-09 PROCEDURE — 92014 COMPRE OPH EXAM EST PT 1/>: CPT | Mod: 25

## 2023-05-09 PROCEDURE — 92202 OPSCPY EXTND ON/MAC DRAW: CPT | Mod: NC

## 2023-05-09 ASSESSMENT — VISUAL ACUITY
OD_CC: 20/30-2
OS_PH: 20/25+1
OS_CC: 20/40-2

## 2023-05-09 ASSESSMENT — TONOMETRY
OS_IOP_MMHG: 17
OD_IOP_MMHG: 18

## 2023-06-13 ENCOUNTER — FOLLOW UP (OUTPATIENT)
Dept: URBAN - METROPOLITAN AREA CLINIC 67 | Facility: CLINIC | Age: 78
End: 2023-06-13

## 2023-06-13 DIAGNOSIS — H35.3231: ICD-10-CM

## 2023-06-13 DIAGNOSIS — H43.813: ICD-10-CM

## 2023-06-13 DIAGNOSIS — H33.193: ICD-10-CM

## 2023-06-13 DIAGNOSIS — H26.492: ICD-10-CM

## 2023-06-13 PROCEDURE — 92202 OPSCPY EXTND ON/MAC DRAW: CPT | Mod: NC

## 2023-06-13 PROCEDURE — 92014 COMPRE OPH EXAM EST PT 1/>: CPT | Mod: 25

## 2023-06-13 PROCEDURE — 92134 CPTRZ OPH DX IMG PST SGM RTA: CPT

## 2023-06-13 PROCEDURE — 67028 INJECTION EYE DRUG: CPT | Mod: 50

## 2023-06-13 ASSESSMENT — VISUAL ACUITY
OS_CC: 20/30-2
OD_CC: 20/50

## 2023-06-13 ASSESSMENT — TONOMETRY
OS_IOP_MMHG: 18
OD_IOP_MMHG: 17

## 2023-07-12 ENCOUNTER — HOSPITAL ENCOUNTER (OUTPATIENT)
Facility: HOSPITAL | Age: 78
Discharge: HOME | End: 2023-07-12
Attending: INTERNAL MEDICINE | Admitting: INTERNAL MEDICINE
Payer: MEDICARE

## 2023-07-12 VITALS
SYSTOLIC BLOOD PRESSURE: 121 MMHG | HEART RATE: 60 BPM | HEIGHT: 68 IN | BODY MASS INDEX: 31.22 KG/M2 | WEIGHT: 206 LBS | RESPIRATION RATE: 17 BRPM | TEMPERATURE: 98.7 F | DIASTOLIC BLOOD PRESSURE: 77 MMHG | OXYGEN SATURATION: 92 %

## 2023-07-12 DIAGNOSIS — R94.39 ABNORMAL STRESS TEST: ICD-10-CM

## 2023-07-12 LAB
ECHO BSA: 2.12 M2
GLUCOSE BLD STRIP.AUTO-MCNC: 162 MG/DL (ref 65–117)
SERVICE CMNT-IMP: ABNORMAL

## 2023-07-12 PROCEDURE — C1894 INTRO/SHEATH, NON-LASER: HCPCS | Performed by: INTERNAL MEDICINE

## 2023-07-12 PROCEDURE — 6360000002 HC RX W HCPCS: Performed by: INTERNAL MEDICINE

## 2023-07-12 PROCEDURE — 2500000003 HC RX 250 WO HCPCS: Performed by: INTERNAL MEDICINE

## 2023-07-12 PROCEDURE — 99152 MOD SED SAME PHYS/QHP 5/>YRS: CPT | Performed by: INTERNAL MEDICINE

## 2023-07-12 PROCEDURE — 93458 L HRT ARTERY/VENTRICLE ANGIO: CPT | Performed by: INTERNAL MEDICINE

## 2023-07-12 PROCEDURE — 2580000003 HC RX 258: Performed by: INTERNAL MEDICINE

## 2023-07-12 PROCEDURE — 6360000004 HC RX CONTRAST MEDICATION: Performed by: INTERNAL MEDICINE

## 2023-07-12 PROCEDURE — 2709999900 HC NON-CHARGEABLE SUPPLY: Performed by: INTERNAL MEDICINE

## 2023-07-12 PROCEDURE — C1713 ANCHOR/SCREW BN/BN,TIS/BN: HCPCS | Performed by: INTERNAL MEDICINE

## 2023-07-12 PROCEDURE — 82962 GLUCOSE BLOOD TEST: CPT

## 2023-07-12 RX ORDER — METOPROLOL SUCCINATE 50 MG/1
50 TABLET, EXTENDED RELEASE ORAL DAILY
COMMUNITY

## 2023-07-12 RX ORDER — ASPIRIN 81 MG/1
81 TABLET ORAL DAILY
Qty: 1 TABLET | Refills: 0 | Status: SHIPPED
Start: 2023-07-12

## 2023-07-12 RX ORDER — M-VIT,TX,IRON,MINS/CALC/FOLIC 27MG-0.4MG
1 TABLET ORAL DAILY
COMMUNITY

## 2023-07-12 RX ORDER — LIDOCAINE HYDROCHLORIDE 10 MG/ML
INJECTION, SOLUTION INFILTRATION; PERINEURAL PRN
Status: DISCONTINUED | OUTPATIENT
Start: 2023-07-12 | End: 2023-07-12 | Stop reason: HOSPADM

## 2023-07-12 RX ORDER — VERAPAMIL HYDROCHLORIDE 2.5 MG/ML
INJECTION, SOLUTION INTRAVENOUS PRN
Status: DISCONTINUED | OUTPATIENT
Start: 2023-07-12 | End: 2023-07-12 | Stop reason: HOSPADM

## 2023-07-12 RX ORDER — HEPARIN SODIUM 200 [USP'U]/100ML
INJECTION, SOLUTION INTRAVENOUS CONTINUOUS PRN
Status: COMPLETED | OUTPATIENT
Start: 2023-07-12 | End: 2023-07-12

## 2023-07-12 RX ORDER — UBIDECARENONE 200 MG
200 CAPSULE ORAL DAILY
COMMUNITY

## 2023-07-12 RX ORDER — HEPARIN SODIUM 1000 [USP'U]/ML
INJECTION, SOLUTION INTRAVENOUS; SUBCUTANEOUS PRN
Status: DISCONTINUED | OUTPATIENT
Start: 2023-07-12 | End: 2023-07-12 | Stop reason: HOSPADM

## 2023-07-12 RX ORDER — AMLODIPINE BESYLATE 10 MG/1
10 TABLET ORAL DAILY
COMMUNITY

## 2023-07-12 RX ORDER — FENTANYL CITRATE 50 UG/ML
INJECTION, SOLUTION INTRAMUSCULAR; INTRAVENOUS PRN
Status: DISCONTINUED | OUTPATIENT
Start: 2023-07-12 | End: 2023-07-12 | Stop reason: HOSPADM

## 2023-07-12 RX ORDER — MIDAZOLAM HYDROCHLORIDE 1 MG/ML
INJECTION INTRAMUSCULAR; INTRAVENOUS PRN
Status: DISCONTINUED | OUTPATIENT
Start: 2023-07-12 | End: 2023-07-12 | Stop reason: HOSPADM

## 2023-07-12 RX ORDER — ROSUVASTATIN CALCIUM 20 MG/1
20 TABLET, COATED ORAL DAILY
Qty: 90 TABLET | Refills: 3 | Status: SHIPPED | OUTPATIENT
Start: 2023-07-12

## 2023-07-12 RX ORDER — OLMESARTAN MEDOXOMIL AND HYDROCHLOROTHIAZIDE 40/25 40; 25 MG/1; MG/1
1 TABLET ORAL DAILY
COMMUNITY

## 2023-07-12 RX ORDER — SODIUM CHLORIDE 9 MG/ML
INJECTION, SOLUTION INTRAVENOUS CONTINUOUS PRN
Status: COMPLETED | OUTPATIENT
Start: 2023-07-12 | End: 2023-07-12

## 2023-07-12 NOTE — PROGRESS NOTES
Cardiac Cath Lab Procedure Area Arrival Note:    Meet Swanson arrived to Cardiac Cath Lab, Procedure Area. Patient identifiers verified with NAME and DATE OF BIRTH. Procedure verified with patient. Consent forms verified. Allergies verified. Patient informed of procedure and plan of care. Questions answered with review. Patient voiced understanding of procedure and plan of care. Patient on cardiac monitor, non-invasive blood pressure, SPO2 monitor. On RA and placed on O2 @ 2 lpm via NC.  IV of NS on pump at 50 ml/hr. Patient status doing well without problems. Patient is A&Ox 4. Patient reports no CP and no SOB. Patient medicated during procedure with orders obtained and verified by Dr. Alka Latif. Refer to patients Cardiac Cath Lab PROCEDURE REPORT for vital signs, assessment, status, and response during procedure, printed at end of case. Printed report on chart or scanned into chart. 1250 CATH LAB to RECOVERY ROOM REPORT    Procedure: [facility]    MD: GAIL Latif MD    The procedure was diagnostic only. Verbal Report given to Recovery Nurse on patient being transferred to Cardiac Cath Lab RR for routine post-op. Patient stable upon transfer to . Vitals, mental status, MAR, procedural summary discussed with recovery RN.     Medication given during procedure:    Contrast:41mL                          Heparin:5000units     Versed:2mg                                                               Fentanyl:25mcg                                                             Sheaths:    Right radial sheath pulled at 1243 , band at 15mL of air

## 2023-07-12 NOTE — PROGRESS NOTES
Cardiac Cath Lab Recovery Arrival Note:      Adamaris Cortez arrived to Cardiac Cath Lab, Recovery Area. Staff introduced to patient. Patient identifiers verified with NAME and DATE OF BIRTH. Procedure verified with patient. Consent forms reviewed and signed by patient or authorized representative and verified. Allergies verified. Patient informed of procedure and plan of care. Questions answered with review. Patient prepped for procedure, per orders from physician, prior to arrival.    Patient on cardiac monitor, non-invasive blood pressure, SPO2 monitor. Patient is A&Ox 4. Patient reports no complaints. Patient in stretcher, in low position, with side rails up, call bell within reach, patient instructed to call of assistance as needed. Patient prep in: [facility] Recovery Area, Bed# FT 2. Family in: Waiting room.    Prep by: Ariadna Velasco RN

## 2023-07-12 NOTE — PROGRESS NOTES
1320: Updated wife on patient status and discharge disposition. 1515: Ambulated patient to the bathroom with a steady gait, voided without difficulty. Patient denies chest pain, shortness of breath, or dizziness. Returned to stretcher. Vital signs stable. Procedural site is clean, dry, and intact. No bleeding, no hematoma. Removed peripheral IV. Patient dressed self. 1540: Educated patient about their sedation precautions such as not driving, operating any machinery, or signing legal documents 24 hours post procedure. Reviewed discharge instructions, including medications and site care using the teach back method. Answered all questions. Verbalized understanding. 1545: Escorted to discharge area in a wheelchair with all of their belongings. Patient's spouse present to take patient home. Reviewed discharge instructions with patients' spouse, they verbalized understanding.

## 2023-07-25 ENCOUNTER — FOLLOW UP (OUTPATIENT)
Dept: URBAN - METROPOLITAN AREA CLINIC 67 | Facility: CLINIC | Age: 78
End: 2023-07-25

## 2023-07-25 DIAGNOSIS — H35.3231: ICD-10-CM

## 2023-07-25 PROCEDURE — 92134 CPTRZ OPH DX IMG PST SGM RTA: CPT

## 2023-07-25 PROCEDURE — 92014 COMPRE OPH EXAM EST PT 1/>: CPT | Mod: 25

## 2023-07-25 PROCEDURE — 67028 INJECTION EYE DRUG: CPT | Mod: 50

## 2023-07-25 PROCEDURE — 92202 OPSCPY EXTND ON/MAC DRAW: CPT | Mod: NC

## 2023-07-25 ASSESSMENT — TONOMETRY
OS_IOP_MMHG: 20
OD_IOP_MMHG: 16

## 2023-07-25 ASSESSMENT — VISUAL ACUITY
OD_CC: 20/40+1
OS_CC: 20/40

## 2023-08-21 ENCOUNTER — HOSPITAL ENCOUNTER (OUTPATIENT)
Facility: HOSPITAL | Age: 78
Setting detail: OBSERVATION
Discharge: HOME | End: 2023-08-22
Attending: INTERNAL MEDICINE | Admitting: INTERNAL MEDICINE
Payer: MEDICARE

## 2023-08-21 DIAGNOSIS — I25.82 COMPLETE OCCLUSION OF CORONARY ARTERY, CHRONIC: ICD-10-CM

## 2023-08-21 LAB
ACT BLD: 239 SECS (ref 79–138)
ACT BLD: 275 SECS (ref 79–138)
ECHO BSA: 2.1 M2
EKG ATRIAL RATE: 55 BPM
EKG DIAGNOSIS: NORMAL
EKG P AXIS: 39 DEGREES
EKG P-R INTERVAL: 194 MS
EKG Q-T INTERVAL: 410 MS
EKG QRS DURATION: 80 MS
EKG QTC CALCULATION (BAZETT): 392 MS
EKG R AXIS: 25 DEGREES
EKG T AXIS: -32 DEGREES
EKG VENTRICULAR RATE: 55 BPM
GLUCOSE BLD STRIP.AUTO-MCNC: 119 MG/DL (ref 65–117)
GLUCOSE BLD STRIP.AUTO-MCNC: 141 MG/DL (ref 65–117)
GLUCOSE BLD STRIP.AUTO-MCNC: 162 MG/DL (ref 65–117)
SERVICE CMNT-IMP: ABNORMAL

## 2023-08-21 PROCEDURE — 82962 GLUCOSE BLOOD TEST: CPT

## 2023-08-21 PROCEDURE — 2580000003 HC RX 258: Performed by: INTERNAL MEDICINE

## 2023-08-21 PROCEDURE — C1725 CATH, TRANSLUMIN NON-LASER: HCPCS | Performed by: INTERNAL MEDICINE

## 2023-08-21 PROCEDURE — C1894 INTRO/SHEATH, NON-LASER: HCPCS | Performed by: INTERNAL MEDICINE

## 2023-08-21 PROCEDURE — 2709999900 HC NON-CHARGEABLE SUPPLY: Performed by: INTERNAL MEDICINE

## 2023-08-21 PROCEDURE — C1874 STENT, COATED/COV W/DEL SYS: HCPCS | Performed by: INTERNAL MEDICINE

## 2023-08-21 PROCEDURE — 6370000000 HC RX 637 (ALT 250 FOR IP): Performed by: INTERNAL MEDICINE

## 2023-08-21 PROCEDURE — 99152 MOD SED SAME PHYS/QHP 5/>YRS: CPT | Performed by: INTERNAL MEDICINE

## 2023-08-21 PROCEDURE — C9607 PERC D-E COR REVASC CHRO SIN: HCPCS | Performed by: INTERNAL MEDICINE

## 2023-08-21 PROCEDURE — C1769 GUIDE WIRE: HCPCS | Performed by: INTERNAL MEDICINE

## 2023-08-21 PROCEDURE — 99153 MOD SED SAME PHYS/QHP EA: CPT | Performed by: INTERNAL MEDICINE

## 2023-08-21 PROCEDURE — G0378 HOSPITAL OBSERVATION PER HR: HCPCS

## 2023-08-21 PROCEDURE — 76937 US GUIDE VASCULAR ACCESS: CPT | Performed by: INTERNAL MEDICINE

## 2023-08-21 PROCEDURE — C1887 CATHETER, GUIDING: HCPCS | Performed by: INTERNAL MEDICINE

## 2023-08-21 PROCEDURE — 6360000002 HC RX W HCPCS: Performed by: INTERNAL MEDICINE

## 2023-08-21 PROCEDURE — C1753 CATH, INTRAVAS ULTRASOUND: HCPCS | Performed by: INTERNAL MEDICINE

## 2023-08-21 PROCEDURE — 6360000004 HC RX CONTRAST MEDICATION: Performed by: INTERNAL MEDICINE

## 2023-08-21 PROCEDURE — 92978 ENDOLUMINL IVUS OCT C 1ST: CPT | Performed by: INTERNAL MEDICINE

## 2023-08-21 PROCEDURE — 85347 COAGULATION TIME ACTIVATED: CPT

## 2023-08-21 PROCEDURE — 93005 ELECTROCARDIOGRAM TRACING: CPT | Performed by: INTERNAL MEDICINE

## 2023-08-21 PROCEDURE — 2500000003 HC RX 250 WO HCPCS: Performed by: INTERNAL MEDICINE

## 2023-08-21 PROCEDURE — C1713 ANCHOR/SCREW BN/BN,TIS/BN: HCPCS | Performed by: INTERNAL MEDICINE

## 2023-08-21 PROCEDURE — C1760 CLOSURE DEV, VASC: HCPCS | Performed by: INTERNAL MEDICINE

## 2023-08-21 PROCEDURE — 93454 CORONARY ARTERY ANGIO S&I: CPT | Performed by: INTERNAL MEDICINE

## 2023-08-21 DEVICE — STENT ONYXNG25022UX ONYX 2.50X22RX
Type: IMPLANTABLE DEVICE | Status: FUNCTIONAL
Brand: ONYX FRONTIER™

## 2023-08-21 RX ORDER — ONDANSETRON 4 MG/1
4 TABLET, ORALLY DISINTEGRATING ORAL EVERY 8 HOURS PRN
Status: DISCONTINUED | OUTPATIENT
Start: 2023-08-21 | End: 2023-08-22 | Stop reason: HOSPADM

## 2023-08-21 RX ORDER — LOSARTAN POTASSIUM 100 MG/1
100 TABLET ORAL DAILY
Status: DISCONTINUED | OUTPATIENT
Start: 2023-08-21 | End: 2023-08-22 | Stop reason: HOSPADM

## 2023-08-21 RX ORDER — SODIUM CHLORIDE 0.9 % (FLUSH) 0.9 %
5-40 SYRINGE (ML) INJECTION EVERY 12 HOURS SCHEDULED
Status: DISCONTINUED | OUTPATIENT
Start: 2023-08-21 | End: 2023-08-22 | Stop reason: HOSPADM

## 2023-08-21 RX ORDER — ASPIRIN 81 MG/1
81 TABLET, CHEWABLE ORAL DAILY
Status: DISCONTINUED | OUTPATIENT
Start: 2023-08-21 | End: 2023-08-22 | Stop reason: HOSPADM

## 2023-08-21 RX ORDER — CLOPIDOGREL 300 MG/1
TABLET, FILM COATED ORAL PRN
Status: DISCONTINUED | OUTPATIENT
Start: 2023-08-21 | End: 2023-08-21 | Stop reason: HOSPADM

## 2023-08-21 RX ORDER — HEPARIN SODIUM 1000 [USP'U]/ML
INJECTION, SOLUTION INTRAVENOUS; SUBCUTANEOUS PRN
Status: DISCONTINUED | OUTPATIENT
Start: 2023-08-21 | End: 2023-08-21 | Stop reason: HOSPADM

## 2023-08-21 RX ORDER — LIDOCAINE HYDROCHLORIDE 10 MG/ML
INJECTION, SOLUTION INFILTRATION; PERINEURAL PRN
Status: DISCONTINUED | OUTPATIENT
Start: 2023-08-21 | End: 2023-08-21 | Stop reason: HOSPADM

## 2023-08-21 RX ORDER — ROSUVASTATIN CALCIUM 40 MG/1
40 TABLET, COATED ORAL DAILY
Status: DISCONTINUED | OUTPATIENT
Start: 2023-08-21 | End: 2023-08-22 | Stop reason: HOSPADM

## 2023-08-21 RX ORDER — METOPROLOL SUCCINATE 50 MG/1
50 TABLET, EXTENDED RELEASE ORAL DAILY
Status: DISCONTINUED | OUTPATIENT
Start: 2023-08-21 | End: 2023-08-22 | Stop reason: HOSPADM

## 2023-08-21 RX ORDER — ACETAMINOPHEN 325 MG/1
650 TABLET ORAL EVERY 4 HOURS PRN
Status: DISCONTINUED | OUTPATIENT
Start: 2023-08-21 | End: 2023-08-21 | Stop reason: SDUPTHER

## 2023-08-21 RX ORDER — SODIUM CHLORIDE 9 MG/ML
INJECTION, SOLUTION INTRAVENOUS PRN
Status: DISCONTINUED | OUTPATIENT
Start: 2023-08-21 | End: 2023-08-22 | Stop reason: HOSPADM

## 2023-08-21 RX ORDER — POLYETHYLENE GLYCOL 3350 17 G/17G
17 POWDER, FOR SOLUTION ORAL DAILY PRN
Status: DISCONTINUED | OUTPATIENT
Start: 2023-08-21 | End: 2023-08-22 | Stop reason: HOSPADM

## 2023-08-21 RX ORDER — CLOPIDOGREL BISULFATE 75 MG/1
75 TABLET ORAL DAILY
COMMUNITY

## 2023-08-21 RX ORDER — FENTANYL CITRATE 50 UG/ML
INJECTION, SOLUTION INTRAMUSCULAR; INTRAVENOUS PRN
Status: DISCONTINUED | OUTPATIENT
Start: 2023-08-21 | End: 2023-08-21 | Stop reason: HOSPADM

## 2023-08-21 RX ORDER — VERAPAMIL HYDROCHLORIDE 2.5 MG/ML
INJECTION, SOLUTION INTRAVENOUS PRN
Status: DISCONTINUED | OUTPATIENT
Start: 2023-08-21 | End: 2023-08-21 | Stop reason: HOSPADM

## 2023-08-21 RX ORDER — ENOXAPARIN SODIUM 100 MG/ML
40 INJECTION SUBCUTANEOUS DAILY
Status: DISCONTINUED | OUTPATIENT
Start: 2023-08-21 | End: 2023-08-22 | Stop reason: HOSPADM

## 2023-08-21 RX ORDER — AMLODIPINE BESYLATE 5 MG/1
10 TABLET ORAL DAILY
Status: DISCONTINUED | OUTPATIENT
Start: 2023-08-21 | End: 2023-08-22 | Stop reason: HOSPADM

## 2023-08-21 RX ORDER — ONDANSETRON 2 MG/ML
4 INJECTION INTRAMUSCULAR; INTRAVENOUS EVERY 6 HOURS PRN
Status: DISCONTINUED | OUTPATIENT
Start: 2023-08-21 | End: 2023-08-22 | Stop reason: HOSPADM

## 2023-08-21 RX ORDER — CLOPIDOGREL BISULFATE 75 MG/1
75 TABLET ORAL DAILY
Status: DISCONTINUED | OUTPATIENT
Start: 2023-08-21 | End: 2023-08-22 | Stop reason: HOSPADM

## 2023-08-21 RX ORDER — SODIUM CHLORIDE 0.9 % (FLUSH) 0.9 %
5-40 SYRINGE (ML) INJECTION PRN
Status: DISCONTINUED | OUTPATIENT
Start: 2023-08-21 | End: 2023-08-22 | Stop reason: HOSPADM

## 2023-08-21 RX ORDER — HEPARIN SODIUM 10000 [USP'U]/ML
INJECTION, SOLUTION INTRAVENOUS; SUBCUTANEOUS PRN
Status: DISCONTINUED | OUTPATIENT
Start: 2023-08-21 | End: 2023-08-21 | Stop reason: HOSPADM

## 2023-08-21 RX ORDER — ACETAMINOPHEN 325 MG/1
650 TABLET ORAL EVERY 6 HOURS PRN
Status: DISCONTINUED | OUTPATIENT
Start: 2023-08-21 | End: 2023-08-22 | Stop reason: HOSPADM

## 2023-08-21 RX ORDER — OLMESARTAN MEDOXOMIL AND HYDROCHLOROTHIAZIDE 40/25 40; 25 MG/1; MG/1
1 TABLET ORAL DAILY
Status: DISCONTINUED | OUTPATIENT
Start: 2023-08-21 | End: 2023-08-21

## 2023-08-21 RX ORDER — HYDROCHLOROTHIAZIDE 25 MG/1
25 TABLET ORAL DAILY
Status: DISCONTINUED | OUTPATIENT
Start: 2023-08-21 | End: 2023-08-22 | Stop reason: HOSPADM

## 2023-08-21 RX ORDER — ACETAMINOPHEN 650 MG/1
650 SUPPOSITORY RECTAL EVERY 6 HOURS PRN
Status: DISCONTINUED | OUTPATIENT
Start: 2023-08-21 | End: 2023-08-22 | Stop reason: HOSPADM

## 2023-08-21 RX ADMIN — SODIUM CHLORIDE, PRESERVATIVE FREE 5 ML: 5 INJECTION INTRAVENOUS at 21:00

## 2023-08-21 NOTE — PROGRESS NOTES
1110: Pt arrived from [facility]. TR band @ 11cc. HOB flat. R radial/groin CDI, no hematoma. 1400: TR band removed. R radial CDI, no hematoma. HOD elevated to 30 degrees. R groin CDI, no hematoma. 1545: Pt ambulated in [place] with no complaints. Voided. Up in chair. R groin/radial CDI, no hematoma.

## 2023-08-21 NOTE — CARDIO/PULMONARY
Chart reviewed: Patient is 69 y.o. male admitted with Complete occlusion of coronary artery, chronic [I25.82]  Chronic total occlusion of coronary artery [I25.82]    Education: CAD education folder given to Meet Swanson at bedside    Patient sleeping soundly, will attempt to follow up.     Radha Back RN

## 2023-08-21 NOTE — BRIEF OP NOTE
Brief Postoperative Note      Patient: Lisa Lomas  YOB: 1945  MRN: [id number]    Date of Procedure: 8/21/2023    Pre-Op Diagnosis Codes:     * Complete occlusion of coronary artery, chronic [I25.82]    Post-Op Diagnosis:  PTCA/PCI of the LAD  with 1 RAFITA       Procedure(s):  Chronic total occlusion coronary intervention  Insert stent rafita coronary  Ultrasound guided vascular access  Percutaneous coronary intervention  Intravascular ultrasound    Surgeon(s):  Júnior Gottlieb III, DO    Assistant:  * No surgical staff found *    Anesthesia: IV Sedation    Estimated Blood Loss (mL): Minimal    Complications: None    Specimens:   * No specimens in log *    Implants:  Implant Name Type Inv.  Item Serial No.  Lot No. LRB No. Used Action   STENT CORONARY CHRISTINE FRONTIER RX 2.5X22 MM ZOTAROLIMUS ELUT - [id number] Coronary stents STENT CORONARY CHRISTINE FRONTIER RX 2.5X22 MM ZOTAROLIMUS ELUT  [employer] [id number] N/A 1 Implanted         Drains: * No LDAs found *    Findings: as above      Electronically signed by Radha Vallejo DO on 8/21/2023 at 11:03 AM

## 2023-08-21 NOTE — PROGRESS NOTES
Cardiac Cath Lab Recovery Arrival Note:      Estela Flow arrived to Cardiac Cath Lab, Recovery Area. Staff introduced to patient. Patient identifiers verified with NAME and DATE OF BIRTH. Procedure verified with patient. Consent forms reviewed and signed by patient or authorized representative and verified. Allergies verified. Patient and family oriented to department. Patient and family informed of procedure and plan of care. Questions answered with review. Patient prepped for procedure, per orders from physician, prior to arrival.    Patient on cardiac monitor, non-invasive blood pressure, SPO2 monitor. On room air. Patient is A&Ox 4. Patient reports no complaints. Patient in stretcher, in low position, with side rails up, call bell within reach, patient instructed to call if assistance as needed. Patient prep in: [facility] 2. Patient family has pager # na  Family in[facility]    Prep by: Khloe Gómez and Aniya    Dual skin assessment performed, no skin impairment noted

## 2023-08-22 VITALS
TEMPERATURE: 97.8 F | SYSTOLIC BLOOD PRESSURE: 98 MMHG | RESPIRATION RATE: 16 BRPM | DIASTOLIC BLOOD PRESSURE: 77 MMHG | HEART RATE: 75 BPM | HEIGHT: 69 IN | OXYGEN SATURATION: 97 % | BODY MASS INDEX: 29.62 KG/M2 | WEIGHT: 200 LBS

## 2023-08-22 DIAGNOSIS — Z95.5 STENTED CORONARY ARTERY: Primary | ICD-10-CM

## 2023-08-22 LAB
ANION GAP SERPL CALC-SCNC: 3 MMOL/L (ref 5–15)
BUN SERPL-MCNC: 16 MG/DL (ref 6–20)
BUN/CREAT SERPL: 22 (ref 12–20)
CALCIUM SERPL-MCNC: 8.7 MG/DL (ref 8.5–10.1)
CHLORIDE SERPL-SCNC: 109 MMOL/L (ref 97–108)
CO2 SERPL-SCNC: 27 MMOL/L (ref 21–32)
CREAT SERPL-MCNC: 0.74 MG/DL (ref 0.7–1.3)
ERYTHROCYTE [DISTWIDTH] IN BLOOD BY AUTOMATED COUNT: 13.2 % (ref 11.5–14.5)
GLUCOSE SERPL-MCNC: 116 MG/DL (ref 65–100)
HCT VFR BLD AUTO: 36.5 % (ref 36.6–50.3)
HGB BLD-MCNC: 12.2 G/DL (ref 12.1–17)
MAGNESIUM SERPL-MCNC: 2.1 MG/DL (ref 1.6–2.4)
MCH RBC QN AUTO: 32.6 PG (ref 26–34)
MCHC RBC AUTO-ENTMCNC: 33.4 G/DL (ref 30–36.5)
MCV RBC AUTO: 97.6 FL (ref 80–99)
NRBC # BLD: 0 K/UL (ref 0–0.01)
NRBC BLD-RTO: 0 PER 100 WBC
PLATELET # BLD AUTO: 184 K/UL (ref 150–400)
PMV BLD AUTO: 10.2 FL (ref 8.9–12.9)
POTASSIUM SERPL-SCNC: 3.5 MMOL/L (ref 3.5–5.1)
RBC # BLD AUTO: 3.74 M/UL (ref 4.1–5.7)
SODIUM SERPL-SCNC: 139 MMOL/L (ref 136–145)
WBC # BLD AUTO: 7.7 K/UL (ref 4.1–11.1)

## 2023-08-22 PROCEDURE — 80048 BASIC METABOLIC PNL TOTAL CA: CPT

## 2023-08-22 PROCEDURE — 83735 ASSAY OF MAGNESIUM: CPT

## 2023-08-22 PROCEDURE — 85027 COMPLETE CBC AUTOMATED: CPT

## 2023-08-22 PROCEDURE — 6370000000 HC RX 637 (ALT 250 FOR IP): Performed by: NURSE PRACTITIONER

## 2023-08-22 PROCEDURE — 36415 COLL VENOUS BLD VENIPUNCTURE: CPT

## 2023-08-22 PROCEDURE — G0378 HOSPITAL OBSERVATION PER HR: HCPCS

## 2023-08-22 RX ORDER — LANOLIN ALCOHOL/MO/W.PET/CERES
3 CREAM (GRAM) TOPICAL
Status: COMPLETED | OUTPATIENT
Start: 2023-08-22 | End: 2023-08-22

## 2023-08-22 RX ADMIN — MELATONIN 3 MG: at 01:41

## 2023-08-22 NOTE — PROGRESS NOTES
With there being no med changes, Pt requested to take his meds at home. Discharge instructions discussed with patient. All questions answered. Patient verbalized understanding. Cath site CDI, no hematoma. Care instructions and restrictions reviewed. Patient instructed to make follow up appts per discharge instructions. Patient signed discharge instructions after reviewing them and duplicate copy placed in chart. Belongings gathered and accounted for. Telemetry monitor and IV removed. Tolerating ambulation in room and hallway. Denies CP, SOB, or dizziness. Escorted out via w/c, discharged home with family in a private vehicle.

## 2023-08-22 NOTE — PLAN OF CARE
Problem: Chronic Conditions and Co-morbidities  Goal: Patient's chronic conditions and co-morbidity symptoms are monitored and maintained or improved  Outcome: Completed  Flowsheets  Taken 8/22/2023 3412 by Modesto Song [facility] - Patient's Chronic Conditions and Co-Morbidity Symptoms are Monitored and Maintained or Improved: Monitor and assess patient's chronic conditions and comorbid symptoms for stability, deterioration, or improvement  Taken 8/21/2023 1930 by Bernadette oCbb RN  Care Plan - Patient's Chronic Conditions and Co-Morbidity Symptoms are Monitored and Maintained or Improved: Monitor and assess patient's chronic conditions and comorbid symptoms for stability, deterioration, or improvement     Problem: Safety - Adult  Goal: Free from fall injury  Outcome: Completed     Problem: Discharge Planning  Goal: Discharge to home or other facility with appropriate resources  Outcome: Completed  Flowsheets  Taken 8/22/2023 2692 by Jerome Rios RN  Discharge to home or other facility with appropriate resources: Identify barriers to discharge with patient and caregiver  Taken 8/21/2023 1930 by Bernadette Cobb RN  Discharge to home or other facility with appropriate resources: Identify barriers to discharge with patient and caregiver

## 2023-09-05 ENCOUNTER — FOLLOW UP (OUTPATIENT)
Dept: URBAN - METROPOLITAN AREA CLINIC 67 | Facility: CLINIC | Age: 78
End: 2023-09-05

## 2023-09-05 DIAGNOSIS — H35.3231: ICD-10-CM

## 2023-09-05 PROCEDURE — 92134 CPTRZ OPH DX IMG PST SGM RTA: CPT

## 2023-09-05 PROCEDURE — 92202 OPSCPY EXTND ON/MAC DRAW: CPT | Mod: NC

## 2023-09-05 PROCEDURE — 92014 COMPRE OPH EXAM EST PT 1/>: CPT | Mod: 25

## 2023-09-05 PROCEDURE — 67028 INJECTION EYE DRUG: CPT | Mod: 50

## 2023-09-05 ASSESSMENT — TONOMETRY
OD_IOP_MMHG: 16
OS_IOP_MMHG: 18

## 2023-09-05 ASSESSMENT — VISUAL ACUITY
OD_CC: 20/40
OS_CC: 20/30

## 2023-10-24 ENCOUNTER — FOLLOW UP (OUTPATIENT)
Dept: URBAN - METROPOLITAN AREA CLINIC 67 | Facility: CLINIC | Age: 78
End: 2023-10-24

## 2023-10-24 DIAGNOSIS — H26.492: ICD-10-CM

## 2023-10-24 DIAGNOSIS — H35.3231: ICD-10-CM

## 2023-10-24 DIAGNOSIS — H33.193: ICD-10-CM

## 2023-10-24 DIAGNOSIS — H43.813: ICD-10-CM

## 2023-10-24 PROCEDURE — 92134 CPTRZ OPH DX IMG PST SGM RTA: CPT

## 2023-10-24 PROCEDURE — 92202 OPSCPY EXTND ON/MAC DRAW: CPT | Mod: NC

## 2023-10-24 PROCEDURE — 92014 COMPRE OPH EXAM EST PT 1/>: CPT | Mod: 25

## 2023-10-24 PROCEDURE — 67028 INJECTION EYE DRUG: CPT | Mod: 50

## 2023-10-24 ASSESSMENT — VISUAL ACUITY
OS_CC: 20/30
OD_CC: 20/40

## 2023-12-05 ENCOUNTER — FOLLOW UP (OUTPATIENT)
Dept: URBAN - METROPOLITAN AREA CLINIC 67 | Facility: CLINIC | Age: 78
End: 2023-12-05

## 2023-12-05 DIAGNOSIS — H35.3231: ICD-10-CM

## 2023-12-05 PROCEDURE — 67028 INJECTION EYE DRUG: CPT | Mod: 50

## 2023-12-05 PROCEDURE — 92202 OPSCPY EXTND ON/MAC DRAW: CPT | Mod: NC

## 2023-12-05 PROCEDURE — 92014 COMPRE OPH EXAM EST PT 1/>: CPT | Mod: 25

## 2023-12-05 PROCEDURE — 92134 CPTRZ OPH DX IMG PST SGM RTA: CPT

## 2023-12-05 ASSESSMENT — TONOMETRY
OD_IOP_MMHG: 15
OS_IOP_MMHG: 15

## 2023-12-05 ASSESSMENT — VISUAL ACUITY
OS_CC: 20/50+2
OD_CC: 20/50-2

## 2024-01-16 ENCOUNTER — FOLLOW UP (OUTPATIENT)
Dept: URBAN - METROPOLITAN AREA CLINIC 67 | Facility: CLINIC | Age: 79
End: 2024-01-16

## 2024-01-16 DIAGNOSIS — H35.3231: ICD-10-CM

## 2024-01-16 PROCEDURE — 92202 OPSCPY EXTND ON/MAC DRAW: CPT | Mod: NC

## 2024-01-16 PROCEDURE — 92014 COMPRE OPH EXAM EST PT 1/>: CPT | Mod: 25

## 2024-01-16 PROCEDURE — 92134 CPTRZ OPH DX IMG PST SGM RTA: CPT

## 2024-01-16 PROCEDURE — 67028 INJECTION EYE DRUG: CPT | Mod: 50

## 2024-01-16 ASSESSMENT — TONOMETRY
OD_IOP_MMHG: 18
OS_IOP_MMHG: 19

## 2024-01-16 ASSESSMENT — VISUAL ACUITY
OD_PH: 20/80-1
OD_CC: 20/250
OS_CC: 20/25+1

## 2024-02-27 ENCOUNTER — FOLLOW UP (OUTPATIENT)
Dept: URBAN - METROPOLITAN AREA CLINIC 67 | Facility: CLINIC | Age: 79
End: 2024-02-27

## 2024-02-27 DIAGNOSIS — H35.3231: ICD-10-CM

## 2024-02-27 PROCEDURE — 92014 COMPRE OPH EXAM EST PT 1/>: CPT | Mod: 25

## 2024-02-27 PROCEDURE — 92134 CPTRZ OPH DX IMG PST SGM RTA: CPT

## 2024-02-27 PROCEDURE — 92202 OPSCPY EXTND ON/MAC DRAW: CPT | Mod: 59

## 2024-02-27 PROCEDURE — HD EYLEA HD 8MG: Mod: JZ

## 2024-02-27 PROCEDURE — 67028 INJECTION EYE DRUG: CPT | Mod: 50

## 2024-02-27 ASSESSMENT — VISUAL ACUITY
OS_CC: 20/40
OD_CC: 20/100+1
OS_PH: 20/25-2

## 2024-02-27 ASSESSMENT — TONOMETRY
OS_IOP_MMHG: 19
OD_IOP_MMHG: 17

## 2024-04-02 ENCOUNTER — FOLLOW UP (OUTPATIENT)
Dept: URBAN - METROPOLITAN AREA CLINIC 67 | Facility: CLINIC | Age: 79
End: 2024-04-02

## 2024-04-02 DIAGNOSIS — H35.3231: ICD-10-CM

## 2024-04-02 PROCEDURE — 92134 CPTRZ OPH DX IMG PST SGM RTA: CPT

## 2024-04-02 PROCEDURE — 67028 INJECTION EYE DRUG: CPT | Mod: 50

## 2024-04-02 PROCEDURE — 92014 COMPRE OPH EXAM EST PT 1/>: CPT | Mod: 25

## 2024-04-02 PROCEDURE — 92202 OPSCPY EXTND ON/MAC DRAW: CPT | Mod: NC

## 2024-04-02 ASSESSMENT — VISUAL ACUITY
OS_CC: 20/40
OD_CC: 20/150-1

## 2024-04-02 ASSESSMENT — TONOMETRY
OS_IOP_MMHG: 18
OD_IOP_MMHG: 18

## 2024-05-13 ENCOUNTER — FOLLOW UP (OUTPATIENT)
Dept: URBAN - METROPOLITAN AREA CLINIC 67 | Facility: CLINIC | Age: 79
End: 2024-05-13

## 2024-05-13 DIAGNOSIS — H35.3231: ICD-10-CM

## 2024-05-13 PROCEDURE — 67028 INJECTION EYE DRUG: CPT | Mod: 50

## 2024-05-13 PROCEDURE — 92134 CPTRZ OPH DX IMG PST SGM RTA: CPT

## 2024-05-13 PROCEDURE — 92202 OPSCPY EXTND ON/MAC DRAW: CPT | Mod: NC

## 2024-05-13 PROCEDURE — 92014 COMPRE OPH EXAM EST PT 1/>: CPT | Mod: 25

## 2024-05-13 ASSESSMENT — TONOMETRY
OS_IOP_MMHG: 22
OD_IOP_MMHG: 21

## 2024-05-13 ASSESSMENT — VISUAL ACUITY
OD_SC: 20/100+2
OS_SC: 20/60+1

## 2024-07-01 ENCOUNTER — FOLLOW UP (OUTPATIENT)
Dept: URBAN - METROPOLITAN AREA CLINIC 67 | Facility: CLINIC | Age: 79
End: 2024-07-01

## 2024-07-01 DIAGNOSIS — H35.3231: ICD-10-CM

## 2024-07-01 PROCEDURE — 92014 COMPRE OPH EXAM EST PT 1/>: CPT | Mod: 25

## 2024-07-01 PROCEDURE — 92202 OPSCPY EXTND ON/MAC DRAW: CPT | Mod: NC

## 2024-07-01 PROCEDURE — 92134 CPTRZ OPH DX IMG PST SGM RTA: CPT

## 2024-07-01 PROCEDURE — 67028 INJECTION EYE DRUG: CPT

## 2024-07-01 ASSESSMENT — VISUAL ACUITY
OS_CC: 20/30-2
OD_CC: 20/250

## 2024-07-01 ASSESSMENT — TONOMETRY
OD_IOP_MMHG: 19
OS_IOP_MMHG: 20

## 2024-08-19 ENCOUNTER — FOLLOW UP (OUTPATIENT)
Dept: URBAN - METROPOLITAN AREA CLINIC 67 | Facility: CLINIC | Age: 79
End: 2024-08-19

## 2024-08-19 DIAGNOSIS — H35.3231: ICD-10-CM

## 2024-08-19 PROCEDURE — 67028 INJECTION EYE DRUG: CPT | Mod: 50

## 2024-08-19 PROCEDURE — 92202 OPSCPY EXTND ON/MAC DRAW: CPT | Mod: NC

## 2024-08-19 PROCEDURE — 92014 COMPRE OPH EXAM EST PT 1/>: CPT | Mod: 25

## 2024-08-19 ASSESSMENT — VISUAL ACUITY
OD_SC: 20/200
OS_SC: 20/25-2

## 2024-08-19 ASSESSMENT — TONOMETRY
OS_IOP_MMHG: 23
OD_IOP_MMHG: 18

## 2024-10-07 ENCOUNTER — FOLLOW UP (OUTPATIENT)
Dept: URBAN - METROPOLITAN AREA CLINIC 67 | Facility: CLINIC | Age: 79
End: 2024-10-07

## 2024-10-07 DIAGNOSIS — H35.3231: ICD-10-CM

## 2024-10-07 PROCEDURE — 92202 OPSCPY EXTND ON/MAC DRAW: CPT | Mod: 59

## 2024-10-07 PROCEDURE — 92014 COMPRE OPH EXAM EST PT 1/>: CPT | Mod: 25

## 2024-10-07 PROCEDURE — 67028 INJECTION EYE DRUG: CPT

## 2024-10-07 PROCEDURE — 92134 CPTRZ OPH DX IMG PST SGM RTA: CPT

## 2024-10-07 ASSESSMENT — VISUAL ACUITY
OS_CC: 20/25+1
OD_CC: 20/300

## 2024-10-07 ASSESSMENT — TONOMETRY
OS_IOP_MMHG: 21
OD_IOP_MMHG: 18

## 2024-10-20 ENCOUNTER — APPOINTMENT (OUTPATIENT)
Facility: HOSPITAL | Age: 79
End: 2024-10-20
Payer: MEDICARE

## 2024-10-20 ENCOUNTER — HOSPITAL ENCOUNTER (EMERGENCY)
Facility: HOSPITAL | Age: 79
Discharge: HOME | End: 2024-10-20
Attending: STUDENT IN AN ORGANIZED HEALTH CARE EDUCATION/TRAINING PROGRAM
Payer: MEDICARE

## 2024-10-20 VITALS
SYSTOLIC BLOOD PRESSURE: 104 MMHG | HEIGHT: 69 IN | OXYGEN SATURATION: 92 % | TEMPERATURE: 98.9 F | DIASTOLIC BLOOD PRESSURE: 61 MMHG | WEIGHT: 202 LBS | HEART RATE: 81 BPM | RESPIRATION RATE: 16 BRPM | BODY MASS INDEX: 29.92 KG/M2

## 2024-10-20 DIAGNOSIS — J40 BRONCHITIS: Primary | ICD-10-CM

## 2024-10-20 PROCEDURE — 99283 EMERGENCY DEPT VISIT LOW MDM: CPT

## 2024-10-20 PROCEDURE — 71046 X-RAY EXAM CHEST 2 VIEWS: CPT

## 2024-10-20 RX ORDER — ALBUTEROL SULFATE 90 UG/1
2 INHALANT RESPIRATORY (INHALATION) 4 TIMES DAILY PRN
Qty: 18 G | Refills: 0 | Status: SHIPPED | OUTPATIENT
Start: 2024-10-20 | End: 2024-10-20

## 2024-10-20 RX ORDER — AZITHROMYCIN 250 MG/1
TABLET, FILM COATED ORAL
Qty: 6 TABLET | Refills: 0 | Status: SHIPPED | OUTPATIENT
Start: 2024-10-20 | End: 2024-10-30

## 2024-10-20 RX ORDER — PREDNISONE 50 MG/1
50 TABLET ORAL DAILY
Qty: 5 TABLET | Refills: 0 | Status: SHIPPED | OUTPATIENT
Start: 2024-10-20 | End: 2024-10-20

## 2024-10-20 RX ORDER — ALBUTEROL SULFATE 90 UG/1
2 INHALANT RESPIRATORY (INHALATION) 4 TIMES DAILY PRN
Qty: 18 G | Refills: 0 | Status: SHIPPED | OUTPATIENT
Start: 2024-10-20

## 2024-10-20 RX ORDER — AZITHROMYCIN 250 MG/1
TABLET, FILM COATED ORAL
Qty: 6 TABLET | Refills: 0 | Status: SHIPPED | OUTPATIENT
Start: 2024-10-20 | End: 2024-10-20

## 2024-10-20 RX ORDER — PREDNISONE 50 MG/1
50 TABLET ORAL DAILY
Qty: 5 TABLET | Refills: 0 | Status: SHIPPED | OUTPATIENT
Start: 2024-10-20 | End: 2024-10-25

## 2024-10-20 ASSESSMENT — PAIN SCALES - GENERAL: PAINLEVEL_OUTOF10: 0

## 2024-10-20 NOTE — ED TRIAGE NOTES
Patient arrives with \"chronic bronchitis\" for the last 11 day. Reports cough with mucus a \"throat constriction\". Patient reports being seen here in the past for the same.

## 2024-10-20 NOTE — ED PROVIDER NOTES
[facility]  EMERGENCY DEPARTMENT ENCOUNTER      Pt Name: Jona Toure  MRN: [id number]  Birthdate 1945  Date of evaluation: 10/20/2024  Provider: Dominga Alvares DO    CHIEF COMPLAINT       Chief Complaint   Patient presents with    Cough         HISTORY OF PRESENT ILLNESS    HPI    Jona Toure is a 79 y.o. male with a history of hypertension, hyperlipidemia, type 2 diabetes who presents to the emergency department for evaluation of a cough.  Patient notes 11 days of cough, has been intermittently productive of phlegm.  No known fevers.  No vomiting or diarrhea.  Notes similar symptoms in the past with bronchitis, states it seems to occur annually typically with weather change.  No known history of previous asthma, COPD, tobacco use history.    Nursing Notes were reviewed.    REVIEW OF SYSTEMS       Review of Systems   Constitutional:  Negative for chills and fever.   Eyes:  Negative for discharge and redness.   Respiratory:  Positive for cough.    Gastrointestinal:  Negative for diarrhea, nausea and vomiting.   Neurological:  Negative for speech difficulty.   Psychiatric/Behavioral:  Negative for agitation.            PAST MEDICAL HISTORY     Past Medical History:   Diagnosis Date    Cancer (HCC) 2017    prostate   treated with radiation    Chronic bronchitis (HCC)     Diabetes (HCC)     Diabetic II    High cholesterol     Hypertension          SURGICAL HISTORY       Past Surgical History:   Procedure Laterality Date    CARDIAC PROCEDURE N/A 7/12/2023    Left heart cath / coronary angiography performed by Raffi Lara MD at [facility] CARDIAC CATH LAB    CARDIAC PROCEDURE N/A 8/21/2023    Chronic total occlusion coronary intervention performed by Henri Harris III, DO at [facility] CARDIAC CATH LAB    CARDIAC PROCEDURE N/A 8/21/2023    Insert stent rafita coronary performed by Henri Harris III, DO at [facility] CARDIAC CATH LAB    CARDIAC PROCEDURE N/A 8/21/2023    Percutaneous coronary

## 2024-11-25 ENCOUNTER — FOLLOW UP (OUTPATIENT)
Dept: URBAN - METROPOLITAN AREA CLINIC 67 | Facility: CLINIC | Age: 79
End: 2024-11-25

## 2024-11-25 DIAGNOSIS — H35.3231: ICD-10-CM

## 2024-11-25 PROCEDURE — 67028 INJECTION EYE DRUG: CPT

## 2024-11-25 PROCEDURE — 92202 OPSCPY EXTND ON/MAC DRAW: CPT | Mod: 59

## 2024-11-25 PROCEDURE — 92014 COMPRE OPH EXAM EST PT 1/>: CPT | Mod: 25

## 2024-11-25 PROCEDURE — 92134 CPTRZ OPH DX IMG PST SGM RTA: CPT

## 2024-11-25 ASSESSMENT — VISUAL ACUITY
OS_CC: 20/30-2
OD_CC: 20/300

## 2024-11-25 ASSESSMENT — TONOMETRY
OD_IOP_MMHG: 12
OS_IOP_MMHG: 14

## 2025-01-13 ENCOUNTER — FOLLOW UP (OUTPATIENT)
Dept: URBAN - METROPOLITAN AREA CLINIC 67 | Facility: CLINIC | Age: 80
End: 2025-01-13

## 2025-01-13 DIAGNOSIS — H35.3231: ICD-10-CM

## 2025-01-13 PROCEDURE — 92202 OPSCPY EXTND ON/MAC DRAW: CPT | Mod: NC

## 2025-01-13 PROCEDURE — 67028 INJECTION EYE DRUG: CPT | Mod: 50

## 2025-01-13 PROCEDURE — 92014 COMPRE OPH EXAM EST PT 1/>: CPT | Mod: 25

## 2025-01-13 ASSESSMENT — VISUAL ACUITY
OS_CC: 20/30
OD_CC: 20/250-1
OS_PH: 20/25

## 2025-01-13 ASSESSMENT — TONOMETRY
OS_IOP_MMHG: 18
OD_IOP_MMHG: 18

## (undated) DEVICE — BITEBLOCK ENDOSCP 60FR MAXI WHT POLYETH STURDY W/ VELC WVN

## (undated) DEVICE — POLYP TRAP: Brand: TRAPEASE®

## (undated) DEVICE — CATH IV AUTOGRD BC PNK 20GA 25 -- INSYTE

## (undated) DEVICE — FORCEPS BX L240CM JAW DIA2.8MM L CAP W/ NDL MIC MESH TOOTH

## (undated) DEVICE — ELECTRODE,RADIOTRANSLUCENT,FOAM,3PK: Brand: MEDLINE

## (undated) DEVICE — GLIDESHEATH SLENDER ACCESS KIT: Brand: GLIDESHEATH SLENDER

## (undated) DEVICE — TR BAND RADIAL ARTERY COMPRESSION DEVICE: Brand: TR BAND

## (undated) DEVICE — 3M™ CUROS™ DISINFECTING CAP FOR NEEDLELESS CONNECTORS 270/CARTON 20 CARTONS/CASE CFF1-270: Brand: CUROS™

## (undated) DEVICE — KIT COR DIAG CATH ANGIO 5FR CURVES JL 4.0 100CM JR 4.0

## (undated) DEVICE — TUBING PRSS MON L6IN PVC M FEM CONN

## (undated) DEVICE — PACK PROCEDURE SURG HRT CATH

## (undated) DEVICE — CATH BLLN ANGIO 2.50X15MM SC EUPHORA RX

## (undated) DEVICE — SPECIAL PROCEDURE DRAPE 32" X 34": Brand: SPECIAL PROCEDURE DRAPE

## (undated) DEVICE — SYRINGE ANGIO 10 CC BRL STD PRNT POLYCARB LT BLU MEDALLION

## (undated) DEVICE — PROVE COVER: Brand: UNBRANDED

## (undated) DEVICE — Device: Brand: ASAHI GLADIUS MONGO14

## (undated) DEVICE — CATH BLLN ANGIO 2.25X15MM SC EUPHORA RX

## (undated) DEVICE — ANGIOGRAPHY KIT

## (undated) DEVICE — CONTAINER SPEC 20 ML LID NEUT BUFF FORMALIN 10 % POLYPR STS

## (undated) DEVICE — ADULT SPO2 SENSOR,REMANUFACTURED,REPROCESSED DEVICE FOR SINGLE USE; REPROCESSED BY COVIDIEN LLC: Brand: NELLCOR

## (undated) DEVICE — KIT HND CTRL 3 W STPCOCK ROT END 54IN PREM HI PRSS TBNG AT

## (undated) DEVICE — ANGIO-SEAL VIP VASCULAR CLOSURE DEVICE: Brand: ANGIO-SEAL

## (undated) DEVICE — CUFF RMFG BP INF SZ 11 DISP -- LAWSON OEM ITEM 238915

## (undated) DEVICE — CORONARY IMAGING CATHETER: Brand: OPTICROSS™ 6 HD

## (undated) DEVICE — KIT ACCS INTRO 4FR L10CM NDL 21GA L7CM GWIRE L40CM

## (undated) DEVICE — BAG SPEC BIOHZRD 10 X 10 IN --

## (undated) DEVICE — KIT MFLD ISOLATN NACL CNTRST PRT TBNG SPIK W/ PRSS TRNSDUC

## (undated) DEVICE — CATHETER GUID 7FR DIA0.081IN SHFT NYL BKUP SUPP L EBU 3.5

## (undated) DEVICE — CORONARY IMAGING CATHETER: Brand: OPTICROSS 6

## (undated) DEVICE — CATHETER GUID 7FR L100CM ID0.081IN EBU3.75 RADPQ MRK BND

## (undated) DEVICE — CANN NASAL O2 CAPNOGRAPHY AD -- FILTERLINE

## (undated) DEVICE — DEVICE EXCHG 2FR BLLN L10MM 0.014IN MRK BND 1MM 20ATM LO

## (undated) DEVICE — 3M™ TEGADERM™ TRANSPARENT FILM DRESSING FRAME STYLE, 1626W, 4 IN X 4-3/4 IN (10 CM X 12 CM), 50/CT 4CT/CASE: Brand: 3M™ TEGADERM™

## (undated) DEVICE — BAG BELONG PT PERS CLEAR HANDL

## (undated) DEVICE — SET GRAV CK VLV NEEDLESS ST 3 GANGED 4WAY STPCOCK HI FLO 10

## (undated) DEVICE — SOLIDIFIER MEDC 1200ML -- CONVERT TO 356117

## (undated) DEVICE — SPLINT WR VELC FOAM NEUT POS DISP FOR RAD ART ACC SFT STRP

## (undated) DEVICE — BAND COMPR L24CM REG CLR PLAS HEMSTAT EXT HK AND LOOP RETEN

## (undated) DEVICE — MICROSURGICAL DILATATION DEVICE: Brand: WOLVERINE CORONARY CUTTING BALLOON

## (undated) DEVICE — SNARE ENDOSCP M L240CM W27MM SHTH DIA2.4MM CHN 2.8MM OVL

## (undated) DEVICE — KIT MED IMAG CNTRST AGNT W/ IOPAMIDOL REUSE

## (undated) DEVICE — KIT COLON W/ 1.1OZ LUB AND 2 END

## (undated) DEVICE — Device

## (undated) DEVICE — KIT AT-X65 ANGIOTOUCH HAND CONTROLLER

## (undated) DEVICE — CATHETER INTVENT 5FR L135CM 0.014IN LO PROF GREATER TIP DSTL

## (undated) DEVICE — SIMPLICITY FLUFF UNDERPAD 23X36, MODERATE: Brand: SIMPLICITY

## (undated) DEVICE — CUSTOM KT PTCA INFL DEV K05 00053H

## (undated) DEVICE — PINNACLE INTRODUCER SHEATH: Brand: PINNACLE

## (undated) DEVICE — WASTE KIT - ST MARY: Brand: MEDLINE INDUSTRIES, INC.

## (undated) DEVICE — 1200 GUARD II KIT W/5MM TUBE W/O VAC TUBE: Brand: GUARDIAN

## (undated) DEVICE — CATHETER GUID 6FR DIA0.071IN SHFT NYL STD L JR 4 CRV ENH

## (undated) DEVICE — ROSEN CURVED WIRE GUIDE: Brand: ROSEN